# Patient Record
Sex: MALE | Race: BLACK OR AFRICAN AMERICAN | NOT HISPANIC OR LATINO | Employment: FULL TIME | URBAN - METROPOLITAN AREA
[De-identification: names, ages, dates, MRNs, and addresses within clinical notes are randomized per-mention and may not be internally consistent; named-entity substitution may affect disease eponyms.]

---

## 2018-04-14 ENCOUNTER — HOSPITAL ENCOUNTER (EMERGENCY)
Facility: CLINIC | Age: 58
Discharge: HOME | End: 2018-04-14
Attending: EMERGENCY MEDICINE | Admitting: EMERGENCY MEDICINE
Payer: COMMERCIAL

## 2018-04-14 VITALS
TEMPERATURE: 97.5 F | RESPIRATION RATE: 16 BRPM | OXYGEN SATURATION: 100 % | SYSTOLIC BLOOD PRESSURE: 131 MMHG | DIASTOLIC BLOOD PRESSURE: 82 MMHG

## 2018-04-14 DIAGNOSIS — Z53.29 LEFT AGAINST MEDICAL ADVICE: ICD-10-CM

## 2018-04-14 DIAGNOSIS — R40.20 LOSS OF CONSCIOUSNESS (H): ICD-10-CM

## 2018-04-14 DIAGNOSIS — Z86.79 HISTORY OF WOLFF-PARKINSON-WHITE (WPW) SYNDROME: ICD-10-CM

## 2018-04-14 LAB — GLUCOSE BLDC GLUCOMTR-MCNC: 104 MG/DL (ref 70–99)

## 2018-04-14 PROCEDURE — 99283 EMERGENCY DEPT VISIT LOW MDM: CPT | Performed by: EMERGENCY MEDICINE

## 2018-04-14 PROCEDURE — 93010 ELECTROCARDIOGRAM REPORT: CPT | Mod: Z6 | Performed by: EMERGENCY MEDICINE

## 2018-04-14 PROCEDURE — 93005 ELECTROCARDIOGRAM TRACING: CPT | Performed by: EMERGENCY MEDICINE

## 2018-04-14 PROCEDURE — 99283 EMERGENCY DEPT VISIT LOW MDM: CPT | Mod: 25 | Performed by: EMERGENCY MEDICINE

## 2018-04-14 PROCEDURE — 00000146 ZZHCL STATISTIC GLUCOSE BY METER IP

## 2018-04-14 ASSESSMENT — ENCOUNTER SYMPTOMS
BACK PAIN: 0
FEVER: 0
NAUSEA: 0
ABDOMINAL PAIN: 0
COUGH: 0
CHILLS: 0
SHORTNESS OF BREATH: 0
CONFUSION: 1
HEADACHES: 0
VOMITING: 0

## 2018-04-14 NOTE — ED NOTES
Pt refusing piv and labs. Asking to leave, but will not elaborate on why he does not want to stay for further evaluation. MD at bedside explaining risks associated with leaving before a medical screening could be completed and urging patient to stay for further workup. Pt very upset, saying he is not being treated kindly, asking to leave immediately, refusing all interventions, asking to sign an ama form, stating he understands all risks associated with leaving.

## 2018-04-14 NOTE — ED NOTES
Bed: ED10  Expected date: 4/14/18  Expected time: 4:37 PM  Means of arrival:   Comments:  [id number]    57M  Altered mental status

## 2018-04-14 NOTE — ED NOTES
"Pt comes in after episode of unresponsiveness at work this afternoon. Earlier today pt became very fatigued at work, took a break, at which point coworkers found him \"slumped over\" in a chair. Per EMS, arousable to painful stimuli. Upon arrival to the ED pt responsive, does not remember \"syncopal\" episode, but says these episodes have been happening more often lately. Last syncopal episode 1 week ago--found unresponsive on the bus. Per pt, he is supposed to have a \"scan to see what's wrong\" and was told \"my heart isn't working great\".     Refusing labs or PIV. La historian, MD at bedside. Alert and oriented upon arrival to the ED.   "

## 2018-04-14 NOTE — ED NOTES
Pt signed ama form in front of rn and md. Stated understanding of risks associated with leaving against medical advice. Left department.

## 2018-04-14 NOTE — ED PROVIDER NOTES
History     Chief Complaint   Patient presents with     Altered Mental Status     The history is provided by the patient.     Ainsley Leal is a 57 year old male with a history of CVA, atrial fibrillation with RVR, WPW status post ablation (on Eliquis) who presents to the Emergency Department today for evaluation of altered mental status. The patient is a poor historian making history difficult to obtain. The patient did report that he was working as a  on the [employer] and serving [employer] when he became fatigued and went to sit down. It is then reported by EMS that the patient patient was found by coworkers slumped over in a chair.  EMS was then called who reports that the patient was arousable to painful stimuli.  The patient is awake and alert currently in our ED.  The patient states that he does not remember his syncopal episode and states that the next thing he remembered was waking up in the ambulance.  He notes that he had eaten today, no prior hx of hypoglycemia.  The patient does report that he was having some chest pain and shortness of breath that started yesterday and went into this morning.  He reports that the pain radiated into his back.  The patient states that he is not currently experiencing those symptoms.  He reports that this pain was in the center of his chest.  He also notes that he has been having increasing numbness in his hands bilaterally that he states has been increasing over the course of several months.  The patient does report that he has been having some palpitations over the course of the past several months but states he did not experience any today.  He states that he did not lose bladder control during his episode today.  The patient does report that he has had similar episodes of syncope in the past and states that he had an episode while he [place] a week ago.  He reports that these episodes have been increasing, record review  "shows that this has been going on for several months.  The patient denies a history of seizures.  The patient denies having any headaches, nausea, vomiting, fever, chills, chest pain, or back pain.  The patient denies a history of issues with low blood sugar.    Per Care Everywhere, prior medical problems include the following: recurrent arrhythmia s/pain PVI ablation (08/2017) with early success, history of WPW, cure via ablation (03/2017)  acutely successful ablation for anterospetal/lillie-Hisian accessory pathway\", paroxysmal a-fib, prior history of stroke for which he received TPA, CMV hepatitis, prior history of cholangitis, prior history of crack cocaine use, positive GUTIERREZ, tobacco use disorder, recurrent episodes of  unwanted, uncontrolled, altered level of consciousness , question narcolepsy, prior history of pericarditis (04/2017). Patient has seen his cardiologist through [facility] in the past for episodes of syncope/fainting, has been advised to have a brain MRI which he has not done since (03/2017), at that point MRI was normal. He had an echocardiogram through [facility] 01/2018 that showed calculated left ventricular ejection fraction of 65% with no regional wall motion abnormalities, indeterminate left ventricular diastolic dysfunction, no significant valvular disease.     I have reviewed the Medications, Allergies, Past Medical and Surgical History, and Social History in the Epic system.    History reviewed. No pertinent past medical history.    History reviewed. No pertinent surgical history.    No family history on file.    Social History   Substance Use Topics     Smoking status: Current Every Day Smoker     Packs/day: 1.00     Years: 15.00     Smokeless tobacco: Not on file     Alcohol use No       Current Facility-Administered Medications   Medication     0.9% sodium chloride BOLUS     Current Outpatient Prescriptions   Medication     apixaban ANTICOAGULANT (ELIQUIS) 5 MG tablet     ASPIRIN PO      " "  Allergies   Allergen Reactions     Penicillins Anaphylaxis     Latex Itching      Review of Systems   Constitutional: Negative for chills and fever.   Respiratory: Negative for cough and shortness of breath (had some yesterday/this morning).    Cardiovascular: Negative for chest pain (had some yesterday/this morning).   Gastrointestinal: Negative for abdominal pain, nausea and vomiting.   Musculoskeletal: Negative for back pain.   Neurological: Positive for syncope. Negative for headaches.   Psychiatric/Behavioral: Positive for confusion.   All other systems reviewed and are negative.      Physical Exam   BP: 131/82  Heart Rate: 80  Temp: 97.5  F (36.4  C)  Resp: 16  SpO2: 100 %      Physical Exam   Constitutional: He is oriented to person, place, and time. No distress.   Adult male, appears sleepy, arousable to voice, unwilling to answer questions.  Irritable.  \"Why don't you ask the people who brought me why I'm here?\".  Very difficult to get any history from him.    HENT:   Head: Atraumatic.   Mouth/Throat: Oropharynx is clear and moist. No oropharyngeal exudate.   Eyes: Pupils are equal, round, and reactive to light. No scleral icterus.   Cardiovascular: Normal rate, regular rhythm, normal heart sounds and intact distal pulses.    No murmur heard.  Pulmonary/Chest: Effort normal and breath sounds normal. No respiratory distress. He has no wheezes. He has no rales.   Abdominal: Soft. Bowel sounds are normal. There is no tenderness.   Musculoskeletal: He exhibits no edema or tenderness.   Neurological: He is alert and oriented to person, place, and time. He has normal strength. No cranial nerve deficit or sensory deficit. Coordination normal. GCS eye subscore is 4. GCS verbal subscore is 5. GCS motor subscore is 6.   Skin: Skin is warm. No rash noted. He is not diaphoretic.   Psychiatric:   Uncooperative, irritable and at times frankly hostile, yelling at medical staff.     Nursing note and vitals " reviewed.      ED Course   4:55 PM  The patient was seen and examined by Dr. Espana in Room 10.    ED Course     Procedures             EKG Interpretation:      Interpreted by Kasia Espana  Time reviewed: 1700  Symptoms at time of EKG: None   Rhythm: normal sinus with PACs  Rate: Normal  Axis: Left Axis Deviation  Ectopy: premature atrial contraction  Conduction: normal  ST Segments/ T Waves: No acute ischemic changes  Q Waves: none  Comparison to prior: No old EKG available    Clinical Impression: sinus rhythm with PACs, no sign of ischemia      Critical Care time:  none             Labs Ordered and Resulted from Time of ED Arrival Up to the Time of Departure from the ED - No data to display        Results for orders placed or performed during the hospital encounter of 04/14/18 (from the past 24 hour(s))   EKG 12 lead   Result Value Ref Range    Interpretation ECG Click View Image link to view waveform and result    Glucose by meter   Result Value Ref Range    Glucose 104 (H) 70 - 99 mg/dL        Assessments & Plan (with Medical Decision Making)   Patient presents for the above complaints. Please note it is extraordinarily difficult to get any history out of the patient, he is awake and alert, but is not overly interested in answering questions. He appears rather despondent. He did tell me that he had a history of WPW and was ablated for this. All of his care has been through [facility].     With sudden loss of consciousness, need to consider any number of etiologies. Electrolyte abnormality is possible such has hypoglycemia, also need to consider electrolyte abnormalities such as hyponatremia. No clinical reason for this based on history, but we will check. Seizure is possible, though it does not appear that he was noted to have any tonic-clonic movements by his coworkers. No prior history of seizures. Did not lose control of bowel or bladder. Cardiac etiology is possible particularly given his cardiac history.  " He does report that he was successfully ablated for his WPW.  I do see this documented in Care Everywhere records by Cardiology.  Patient also was complaining of some chest pain radiating through to the back yesterday.  Unclear if this is musculoskeletal, but in the context of loss of consciousness combined with bilateral hand paresthesias need to consider aortic etiology.  Per Care Everywhere, he had an echocardiogram in January of this year which showed no  valvular disease at that point, no particular mention was made of the aorta that I can see.  He did have a cardiac CT angiogram done through [facility] 08/24/17 which showed normal caliber visualized aortic root, ascending aorta, and descending thoracic aorta at that time, also showed severe left atrial enlargement.  Other etiologies of symptoms could include a sleep disorder, it appears that his cardiologist was considering narcolepsy as a possible diagnosis given the fact that he appears to have had this happen multiple times and it sometimes can last for hours (patient reported to his cardiologist that he once [place] on the train and did not wake up for 5 hours).    EKG was done here in the emergency department.  This demonstrates sinus rhythm with PACs, he has a left anterior fascicular block, I do not particularly see a delta wave.  I do not see any sign of ectopy or ischemia at present.      Patient would not allow blood work to be drawn.  I went back into the room to discuss this with him.  He was extraordinarily irritable and hostile.  He claimed he was being treated \"like a guilty person\".  Of note, for most of his ED visit, he is lying back in bed, sleeping, and will open his eyes to voice but turns his head away and refuses to engage in conversation. He demanded to leave immediately.  I told him that that is his right, but I have to go over risks of leaving.  He then simply closed his eyes and turned away, would not respond to me when I told him " with the risks of leaving without evaluation or workup.  I repeated this to him at which point he became very angry and started yelling at me.  This was witnessed by the nurse.  Patient will be leaving AGAINST MEDICAL ADVICE as he does not wish to have a workup.  The patient recognizes that he is leaving AMA without a workup and the risks of leaving could include serious consequences including death and/or permanent disability.  Patient wishes to leave immediately.  Signed out AMA.    I have reviewed the nursing notes.    I have reviewed the findings, diagnosis, plan and need for follow up with the patient.  This part of the document was transcribed by Damaris Sloan Medical Scribe.   Discharge Medication List as of 4/14/2018  6:20 PM          Final diagnoses:   Loss of consciousness (H)   History of Sachin-Parkinson-White (WPW) syndrome   Left against medical advice   Alex BLACKBURN, am serving as a trained medical scribe to document services personally performed by Kasia Espana MD, based on the provider's statements to me.   Kasia BLACKBURN MD, was physically present and have reviewed and verified the accuracy of this note documented by Alex Vázquez.     4/14/2018   [facility], EMERGENCY DEPARTMENT     Kasia Espana MD  04/14/18 1913

## 2018-04-14 NOTE — DISCHARGE INSTRUCTIONS
You have been transported to the emergency department by paramedics for fainting.  You have declined to allow an evaluation.  You have requested to leave against medical advice.  You are acknowledging that you are risking the possibility of death or permanent disability by leaving without a workup.  You can return at any time if you change your mind.  You should follow-up with your primary clinic.

## 2018-04-15 LAB — INTERPRETATION ECG - MUSE: NORMAL

## 2018-11-18 ENCOUNTER — HOSPITAL ENCOUNTER (EMERGENCY)
Facility: CLINIC | Age: 58
Discharge: HOME | End: 2018-11-18
Attending: EMERGENCY MEDICINE | Admitting: EMERGENCY MEDICINE
Payer: COMMERCIAL

## 2018-11-18 VITALS
DIASTOLIC BLOOD PRESSURE: 89 MMHG | OXYGEN SATURATION: 96 % | RESPIRATION RATE: 18 BRPM | BODY MASS INDEX: 23.81 KG/M2 | SYSTOLIC BLOOD PRESSURE: 124 MMHG | TEMPERATURE: 97.7 F | WEIGHT: 152 LBS

## 2018-11-18 DIAGNOSIS — S61.217A LACERATION OF LEFT LITTLE FINGER WITHOUT FOREIGN BODY WITHOUT DAMAGE TO NAIL, INITIAL ENCOUNTER: ICD-10-CM

## 2018-11-18 PROCEDURE — 99283 EMERGENCY DEPT VISIT LOW MDM: CPT | Mod: 25 | Performed by: EMERGENCY MEDICINE

## 2018-11-18 PROCEDURE — 99283 EMERGENCY DEPT VISIT LOW MDM: CPT | Performed by: EMERGENCY MEDICINE

## 2018-11-18 PROCEDURE — 12001 RPR S/N/AX/GEN/TRNK 2.5CM/<: CPT | Performed by: EMERGENCY MEDICINE

## 2018-11-18 PROCEDURE — 12001 RPR S/N/AX/GEN/TRNK 2.5CM/<: CPT | Mod: Z6 | Performed by: EMERGENCY MEDICINE

## 2018-11-18 NOTE — ED PROVIDER NOTES
[place] EMERGENCY DEPARTMENT ([facility])  November 18, 2018    History     Chief Complaint   Patient presents with     Laceration     HPI  Ainsley Leal is a 58 year old male with history n notable for atrial fibrillation with RVR (on Eliquis) who presents to the ED with a left pinky finger laceration. Patient states he cut himself with a knife while prepping food and had a decent amount of bleeding.  He denies any weakness or pain with range of motion in that finger.    Per [facility], patient's last tetanus was in 2016.    PAST MEDICAL HISTORY  No past medical history on file.  PAST SURGICAL HISTORY  No past surgical history on file.  FAMILY HISTORY  No family history on file.  SOCIAL HISTORY  Social History   Substance Use Topics     Smoking status: Current Every Day Smoker     Packs/day: 1.00     Years: 15.00     Smokeless tobacco: Not on file     Alcohol use No     MEDICATIONS  Current Facility-Administered Medications   Medication     skin closure adhesive (DERMABOND) vial     Current Outpatient Prescriptions   Medication     apixaban ANTICOAGULANT (ELIQUIS) 5 MG tablet     ASPIRIN PO     ALLERGIES  Allergies   Allergen Reactions     Penicillins Anaphylaxis     Latex Itching       I have reviewed the Medications, Allergies, Past Medical and Surgical History, and Social History in the Epic system.    Review of Systems  ROS: 14 point ROS neg other than the symptoms noted above in the HPI.    Physical Exam   BP: 133/89  Heart Rate: 118  Temp: 97.7  F (36.5  C)  Weight: 68.9 kg (152 lb)  SpO2: 98 %      Physical Exam  GEN: Well appearing, non toxic, cooperative.   HEENT: The head is normocephalic and atraumatic. Pupils are equal round and reactive to light. Extraocular motions are intact. There is no facial swelling. The neck is nontender and supple.   CV: Regular rate and rhythm without murmurs rubs or gallops. 2+ radial pulses bilaterally.  PULM: Clear to auscultation bilaterally.  ABD: Soft,  nontender, nondistended.   EXT: Full range of motion.  No edema.  1 cm laceration to the lateral aspect of his left fifth digit.  Strength intact in all directions.  Sensation intact.  Good cap refill distal to the laceration.  NEURO: Cranial nerves II through XII are intact and symmetric. Bilateral upper and lower extremities grossly show full range of motion without any focal deficits.   SKIN: No rashes, ecchymosis, or lacerations  PSYCH: Calm and cooperative, interactive.     ED Course     ED Course     Laceration repair  Date/Time: 11/18/2018 12:18 PM  Performed by: MARYJANE JENSEN  Authorized by: MARYJANE JENSEN   Consent: Verbal consent obtained.  Risks and benefits: risks, benefits and alternatives were discussed  Consent given by: patient  Patient understanding: patient states understanding of the procedure being performed  Site marked: the operative site was marked  Patient identity confirmed: verbally with patient  Body area: upper extremity  Location details: left small finger  Laceration length: 1 cm  Tendon involvement: none  Nerve involvement: none  Vascular damage: no    Sedation:  Patient sedated: no  Irrigation solution: tap water  Amount of cleaning: extensive  Debridement: none  Skin closure: glue  Approximation: close  Approximation difficulty: simple  Patient tolerance: Patient tolerated the procedure well with no immediate complications         11:43 AM  The patient was seen and examined by Dr. Jensen in Room 7.                Critical Care time:  none             Labs Ordered and Resulted from Time of ED Arrival Up to the Time of Departure from the ED - No data to display         Assessments & Plan (with Medical Decision Making)   Patient is a 58-year-old male who presents with a 1 cm laceration to the lateral aspect of his left fifth digit.  Laceration occurred while he was preparing food.  Initial vitals were within normal limits.  Exam as above most notable for laceration.  Patient had full  range of motion and CMS was intact distal to the laceration.  Laceration was cleaned with tap water extensively and closed with Dermabond glue.  See attached procedure note for further details.  Patient's tetanus was up-to-date and did not did not require booster.  Patient was advised to follow-up with his primary doctor as needed and also given strict return precautions for any signs of infection.  Patient discharged home in stable condition.    I have reviewed the nursing notes.    I have reviewed the findings, diagnosis, plan and need for follow up with the patient.    New Prescriptions    No medications on file       Final diagnoses:   Laceration of left little finger without foreign body without damage to nail, initial encounter     I, Zaid Tillman, am serving as a trained medical scribe to document services personally performed by Speedy Jensen MD, based on the provider's statements to me.      I,  Speedy Jensen MD, was physically present and have reviewed and verified the accuracy of this note documented by Zaid Tillman.     11/18/2018   [facility], [place], EMERGENCY DEPARTMENT     Speedy Jensen MD  11/18/18 1223

## 2018-11-18 NOTE — DISCHARGE INSTRUCTIONS
* Laceration (All Closures)  A laceration is a cut through the skin. This will usually require stitches (sutures) or staples if it is deep. Minor cuts may be treated with a tape closure ( Steri-Strips ) or Dermabond skin glue.    Home Care:  PAIN MEDICINE: You may use acetaminophen (Tylenol) 650-1000 mg every 6 hours or ibuprofen (Motrin, Advil) 600 mg every 6-8 hours with food to control pain, if you are able to take these medicines. [NOTE: If you have chronic liver or kidney disease or ever had a stomach ulcer or GI bleeding, talk with your doctor before using these medicines.]  EXTREMITY, FACE or TRUNK WOUNDS:    Keep the wound clean and dry. If a bandage was applied and it becomes wet or dirty, replace it. Otherwise, leave it in place for the first 24 hours.    If stitches or staples were used, clean the wound daily. Protect the wound from sunlight and tanning lamps.    After removing the bandage, wash the area with soap and water. Use a wet cotton swab (Q tip) to loosen and remove any blood or crust that forms.    After cleaning, apply a thin layer of Polysporin or Bacitracin ointment. This will keep the wound clean and make it easier to remove the stitches or staples. Reapply a fresh bandage.    You may remove the bandage to shower as usual after the first 24 hours, but do not soak the area in water (no swimming) until the stitches or staples are removed.    If Steri-Strips were used, keep the area clean and dry. If it becomes wet, blot it dry with a towel. It is okay to take a brief shower, but avoid scrubbing the area.    If Dermabond skin adhesive was used, do not scratch, rub or pick at the adhesive film. Do not place tape directly over the film. Do not apply liquid, ointment or creams to the wound while the film is in place. Do not clean the wound with peroxide and do not apply ointments. Avoid activities that cause heavy sweating until the film has fallen off. Protect the wound from prolonged exposure  to sunlight or tanning lamps. You may shower as usual but do not soak the wound in water (no baths or swimming). The film will fall off by itself in 5-10 days.  SCALP WOUNDS: During the first two days, you may carefully rinse your hair in the shower to remove blood, glass or dirt particles. After two days, you may shower and shampoo your hair normally. Do not soak your scalp in the tub or go swimming until the stitches or staples have been removed.  MOUTH WOUNDS: Eat soft foods to reduce pain. If the cut is inside of your mouth, clean by rinsing after each meal and at bedtime with a mixture of equal parts water and Hydrogen Peroxide (do not swallow!). Or, you can use a cotton swab to directly apply Hydrogen Peroxide onto the cut.  After the wound is done healing, use sunscreen over the area whenever exposed for the next 6 minths to avoid a darker scar.  Follow Up:  Most skin wounds heal within ten days. Mouth and facial wounds heal within five days. However, even with proper treatment, a wound infection may sometimes occur. Therefore, you should check the wound daily for signs of infection listed below.  Stitches should be removed from the face within five days; stitches and staples should be removed from other parts of the body within 7-10 days. Unless you are told to come back to the emergency room, you may have your doctor or urgent care remove the stitches. If dissolving stitches were used in the mouth, these will fall out or dissolve without the need for removal. If tape closures ( Steri-Strips ) were used, remove them yourself if they have not fallen off after 7 days. If Dermabond skin glue was used, the film will fall off by itself in 5-10 days.   Get Prompt Medical Attention  if any of the following occur:    Increasing pain in the wound    Redness, swelling or pus coming from the wound    Fever over 101 F (38.3 C) oral    If stitches or staples come apart or fall out or if Steri-Strips fall off before seven  days    If the wound edges re-open    Bleeding not controlled by direct pressure    0788-3490 [employer]. [address] [place] [address], [place] [address]. All rights reserved. This information is not intended as a substitute for professional medical care. Always follow your healthcare professional's instructions.  This information has been modified by your health care provider with permission from the publisher.  Modifications clinically reviewed by Dr. Davon Kearns on 7/20/18.

## 2018-11-18 NOTE — ED TRIAGE NOTES
Pt arrived to the ED with a laceration on his left 5th finger. Per pt he cut it with a knife today. On arrival HR 110s, per pt that is average for pt. Pt alert and oriented x4. Pt reports he is on oral blood thinners.

## 2018-11-18 NOTE — ED AVS SNAPSHOT
[facility], [place], Emergency Department    [address] [place] [address]    Phone:  [phone]                                       Ainsley Leal   MRN: [id number]    Department:  [facility], Emergency Department   Date of Visit:  11/18/2018           After Visit Summary Signature Page     I have received my discharge instructions, and my questions have been answered. I have discussed any challenges I see with this plan with the nurse or doctor.    ..........................................................................................................................................  Patient/Patient Representative Signature      ..........................................................................................................................................  Patient Representative Print Name and Relationship to Patient    ..................................................               ................................................  Date                                   Time    ..........................................................................................................................................  Reviewed by Signature/Title    ...................................................              ..............................................  Date                                               Time          22EPIC Rev 08/18

## 2018-11-18 NOTE — ED AVS SNAPSHOT
[facility], Emergency Department    [address] [facility] [address]    Phone:  [phone]                                       Ainsley Leal   MRN: [id number]    Department:  [facility], Emergency Department   Date of Visit:  11/18/2018           Patient Information     Date Of Birth          1960        Your diagnoses for this visit were:     Laceration of left little finger without foreign body without damage to nail, initial encounter        You were seen by Speedy Jensen MD.      Follow-up Information     Schedule an appointment as soon as possible for a visit with Vik Stevenson MD.    Specialty:  Physician Assistant    Why:  As needed    Contact information:    [facility]  [address] [facility] [address] [phone]          Discharge Instructions          * Laceration (All Closures)  A laceration is a cut through the skin. This will usually require stitches (sutures) or staples if it is deep. Minor cuts may be treated with a tape closure ( Steri-Strips ) or Dermabond skin glue.    Home Care:  PAIN MEDICINE: You may use acetaminophen (Tylenol) 650-1000 mg every 6 hours or ibuprofen (Motrin, Advil) 600 mg every 6-8 hours with food to control pain, if you are able to take these medicines. [NOTE: If you have chronic liver or kidney disease or ever had a stomach ulcer or GI bleeding, talk with your doctor before using these medicines.]  EXTREMITY, FACE or TRUNK WOUNDS:    Keep the wound clean and dry. If a bandage was applied and it becomes wet or dirty, replace it. Otherwise, leave it in place for the first 24 hours.    If stitches or staples were used, clean the wound daily. Protect the wound from sunlight and tanning lamps.    After removing the bandage, wash the area with soap and water. Use a wet cotton swab (Q tip) to loosen and remove any blood or crust that forms.    After cleaning, apply a thin layer of Polysporin or Bacitracin ointment. This  will keep the wound clean and make it easier to remove the stitches or staples. Reapply a fresh bandage.    You may remove the bandage to shower as usual after the first 24 hours, but do not soak the area in water (no swimming) until the stitches or staples are removed.    If Steri-Strips were used, keep the area clean and dry. If it becomes wet, blot it dry with a towel. It is okay to take a brief shower, but avoid scrubbing the area.    If Dermabond skin adhesive was used, do not scratch, rub or pick at the adhesive film. Do not place tape directly over the film. Do not apply liquid, ointment or creams to the wound while the film is in place. Do not clean the wound with peroxide and do not apply ointments. Avoid activities that cause heavy sweating until the film has fallen off. Protect the wound from prolonged exposure to sunlight or tanning lamps. You may shower as usual but do not soak the wound in water (no baths or swimming). The film will fall off by itself in 5-10 days.  SCALP WOUNDS: During the first two days, you may carefully rinse your hair in the shower to remove blood, glass or dirt particles. After two days, you may shower and shampoo your hair normally. Do not soak your scalp in the tub or go swimming until the stitches or staples have been removed.  MOUTH WOUNDS: Eat soft foods to reduce pain. If the cut is inside of your mouth, clean by rinsing after each meal and at bedtime with a mixture of equal parts water and Hydrogen Peroxide (do not swallow!). Or, you can use a cotton swab to directly apply Hydrogen Peroxide onto the cut.  After the wound is done healing, use sunscreen over the area whenever exposed for the next 6 minths to avoid a darker scar.  Follow Up:  Most skin wounds heal within ten days. Mouth and facial wounds heal within five days. However, even with proper treatment, a wound infection may sometimes occur. Therefore, you should check the wound daily for signs of infection listed  below.  Stitches should be removed from the face within five days; stitches and staples should be removed from other parts of the body within 7-10 days. Unless you are told to come back to the emergency room, you may have your doctor or urgent care remove the stitches. If dissolving stitches were used in the mouth, these will fall out or dissolve without the need for removal. If tape closures ( Steri-Strips ) were used, remove them yourself if they have not fallen off after 7 days. If Dermabond skin glue was used, the film will fall off by itself in 5-10 days.   Get Prompt Medical Attention  if any of the following occur:    Increasing pain in the wound    Redness, swelling or pus coming from the wound    Fever over 101 F (38.3 C) oral    If stitches or staples come apart or fall out or if Steri-Strips fall off before seven days    If the wound edges re-open    Bleeding not controlled by direct pressure    2780-3279 [employer]. [address] [place] [address]. All rights reserved. This information is not intended as a substitute for professional medical care. Always follow your healthcare professional's instructions.  This information has been modified by your health care provider with permission from the publisher.  Modifications clinically reviewed by Dr. Davon Kearns on 7/20/18.      24 Hour Appointment Hotline       To make an appointment at any [facility], call [phone] ([phone]). If you don't have a family doctor or clinic, we will help you find one. [place] clinics are conveniently located to serve the needs of you and your family.             Review of your medicines      Our records show that you are taking the medicines listed below. If these are incorrect, please call your family doctor or clinic.        Dose / Directions Last dose taken    ASPIRIN PO   Dose:  81 mg        Take 81 mg by mouth   Refills:  0        ELIQUIS 5 MG tablet   Dose:  5 mg   Generic drug:   "apixaban ANTICOAGULANT        Take 5 mg by mouth   Refills:  0                Procedures and tests performed during your visit     Laceration repair      Orders Needing Specimen Collection     None      Pending Results     No orders found from 2018 to 2018.            Pending Culture Results     No orders found from 2018 to 2018.            Pending Results Instructions     If you had any lab results that were not finalized at the time of your Discharge, you can call the ED Lab Result RN at [phone]. You will be contacted by this team for any positive Lab results or changes in treatment. The nurses are available 7 days a week from 10A to 6:30P.  You can leave a message 24 hours per day and they will return your call.        Thank you for choosing [facility]       Thank you for choosing [facility] for your care. Our goal is always to provide you with excellent care. Hearing back from our patients is one way we can continue to improve our services. Please take a few minutes to complete the written survey that you may receive in the mail after you visit with us. Thank you!        [employer] Information     [employer] lets you send messages to your doctor, view your test results, renew your prescriptions, schedule appointments and more. To sign up, go to [email or web address] . Click on \"Log in\" on the left side of the screen, which will take you to the Welcome page. Then click on \"Sign up Now\" on the right side of the page.     You will be asked to enter the access code listed below, as well as some personal information. Please follow the directions to create your username and password.     Your access code is: [email or web address]  Expires: 2019 12:26 PM     Your access code will  in 90 days. If you need help or a new code, please call your [facility] clinic or [phone].        Care EveryWhere ID     This is your Care EveryWhere ID. This could be used by other organizations to access your " [facility] medical records  [id number]        Equal Access to Services     POLINA LORENZO : Hadii estevan Paul, saul loza, kamran pope. So [facility] [phone].    ATENCIÓN: Si habla español, tiene a zavala disposición servicios gratuitos de asistencia lingüística. Llame al [phone].    We comply with applicable federal civil rights laws and [place] laws. We do not discriminate on the basis of race, color, national origin, age, disability, sex, sexual orientation, or gender identity.            After Visit Summary       This is your record. Keep this with you and show to your community pharmacist(s) and doctor(s) at your next visit.

## 2020-02-17 ENCOUNTER — HOSPITAL ENCOUNTER (EMERGENCY)
Facility: CLINIC | Age: 60
Discharge: HOME | End: 2020-02-18
Attending: EMERGENCY MEDICINE | Admitting: EMERGENCY MEDICINE
Payer: COMMERCIAL

## 2020-02-17 DIAGNOSIS — R10.31 RIGHT INGUINAL PAIN: ICD-10-CM

## 2020-02-17 PROCEDURE — 99285 EMERGENCY DEPT VISIT HI MDM: CPT | Mod: 25 | Performed by: EMERGENCY MEDICINE

## 2020-02-17 PROCEDURE — 99284 EMERGENCY DEPT VISIT MOD MDM: CPT | Mod: Z6 | Performed by: EMERGENCY MEDICINE

## 2020-02-17 ASSESSMENT — ENCOUNTER SYMPTOMS
SHORTNESS OF BREATH: 0
FATIGUE: 0
CHILLS: 0
LIGHT-HEADEDNESS: 0
WOUND: 0
DIARRHEA: 0
DIZZINESS: 0
FEVER: 0
WEAKNESS: 0

## 2020-02-17 ASSESSMENT — MIFFLIN-ST. JEOR: SCORE: 1486.71

## 2020-02-17 NOTE — ED AVS SNAPSHOT
[facility], [place], Emergency Department  [address] [facility] [address]  Phone:  [phone]                                    Ainsley Leal   MRN: [id number]    Department:  [facility], Emergency Department   Date of Visit:  2/17/2020           After Visit Summary Signature Page    I have received my discharge instructions, and my questions have been answered. I have discussed any challenges I see with this plan with the nurse or doctor.    ..........................................................................................................................................  Patient/Patient Representative Signature      ..........................................................................................................................................  Patient Representative Print Name and Relationship to Patient    ..................................................               ................................................  Date                                   Time    ..........................................................................................................................................  Reviewed by Signature/Title    ...................................................              ..............................................  Date                                               Time          22EPIC Rev 08/18

## 2020-02-18 ENCOUNTER — APPOINTMENT (OUTPATIENT)
Dept: CT IMAGING | Facility: CLINIC | Age: 60
End: 2020-02-18
Attending: EMERGENCY MEDICINE
Payer: COMMERCIAL

## 2020-02-18 ENCOUNTER — APPOINTMENT (OUTPATIENT)
Dept: ULTRASOUND IMAGING | Facility: CLINIC | Age: 60
End: 2020-02-18
Attending: EMERGENCY MEDICINE
Payer: COMMERCIAL

## 2020-02-18 VITALS
RESPIRATION RATE: 16 BRPM | HEIGHT: 67 IN | BODY MASS INDEX: 24.33 KG/M2 | DIASTOLIC BLOOD PRESSURE: 76 MMHG | SYSTOLIC BLOOD PRESSURE: 128 MMHG | HEART RATE: 75 BPM | OXYGEN SATURATION: 98 % | TEMPERATURE: 97.6 F | WEIGHT: 155 LBS

## 2020-02-18 VITALS
SYSTOLIC BLOOD PRESSURE: 111 MMHG | OXYGEN SATURATION: 98 % | TEMPERATURE: 98.2 F | WEIGHT: 155 LBS | HEIGHT: 67 IN | RESPIRATION RATE: 22 BRPM | BODY MASS INDEX: 24.33 KG/M2 | DIASTOLIC BLOOD PRESSURE: 64 MMHG | HEART RATE: 92 BPM

## 2020-02-18 DIAGNOSIS — R10.31 RIGHT GROIN PAIN: ICD-10-CM

## 2020-02-18 LAB
ALBUMIN SERPL-MCNC: 4.3 G/DL (ref 3.4–5)
ALBUMIN UR-MCNC: NEGATIVE MG/DL
ALP SERPL-CCNC: 92 U/L (ref 40–150)
ALT SERPL W P-5'-P-CCNC: 32 U/L (ref 0–70)
ANION GAP SERPL CALCULATED.3IONS-SCNC: 3 MMOL/L (ref 3–14)
APPEARANCE UR: CLEAR
AST SERPL W P-5'-P-CCNC: 19 U/L (ref 0–45)
BASOPHILS # BLD AUTO: 0.1 10E9/L (ref 0–0.2)
BASOPHILS NFR BLD AUTO: 1.5 %
BILIRUB SERPL-MCNC: 0.2 MG/DL (ref 0.2–1.3)
BILIRUB UR QL STRIP: NEGATIVE
BUN SERPL-MCNC: 19 MG/DL (ref 7–30)
CALCIUM SERPL-MCNC: 9.1 MG/DL (ref 8.5–10.1)
CHLORIDE SERPL-SCNC: 106 MMOL/L (ref 94–109)
CO2 SERPL-SCNC: 28 MMOL/L (ref 20–32)
COLOR UR AUTO: YELLOW
CREAT SERPL-MCNC: 0.54 MG/DL (ref 0.66–1.25)
DIFFERENTIAL METHOD BLD: NORMAL
EOSINOPHIL # BLD AUTO: 0.1 10E9/L (ref 0–0.7)
EOSINOPHIL NFR BLD AUTO: 1.5 %
ERYTHROCYTE [DISTWIDTH] IN BLOOD BY AUTOMATED COUNT: 13.5 % (ref 10–15)
GFR SERPL CREATININE-BSD FRML MDRD: >90 ML/MIN/{1.73_M2}
GLUCOSE SERPL-MCNC: 99 MG/DL (ref 70–99)
GLUCOSE UR STRIP-MCNC: NEGATIVE MG/DL
HCT VFR BLD AUTO: 49.7 % (ref 40–53)
HGB BLD-MCNC: 15.7 G/DL (ref 13.3–17.7)
HGB UR QL STRIP: NEGATIVE
IMM GRANULOCYTES # BLD: 0 10E9/L (ref 0–0.4)
IMM GRANULOCYTES NFR BLD: 0.4 %
KETONES UR STRIP-MCNC: NEGATIVE MG/DL
LEUKOCYTE ESTERASE UR QL STRIP: NEGATIVE
LYMPHOCYTES # BLD AUTO: 3 10E9/L (ref 0.8–5.3)
LYMPHOCYTES NFR BLD AUTO: 55.7 %
MCH RBC QN AUTO: 28.3 PG (ref 26.5–33)
MCHC RBC AUTO-ENTMCNC: 31.6 G/DL (ref 31.5–36.5)
MCV RBC AUTO: 90 FL (ref 78–100)
MONOCYTES # BLD AUTO: 0.6 10E9/L (ref 0–1.3)
MONOCYTES NFR BLD AUTO: 10.3 %
MUCOUS THREADS #/AREA URNS LPF: PRESENT /LPF
NEUTROPHILS # BLD AUTO: 1.6 10E9/L (ref 1.6–8.3)
NEUTROPHILS NFR BLD AUTO: 30.6 %
NITRATE UR QL: NEGATIVE
NRBC # BLD AUTO: 0 10*3/UL
NRBC BLD AUTO-RTO: 0 /100
PH UR STRIP: 5.5 PH (ref 5–7)
PLATELET # BLD AUTO: 296 10E9/L (ref 150–450)
POTASSIUM SERPL-SCNC: 4 MMOL/L (ref 3.4–5.3)
PROT SERPL-MCNC: 7.8 G/DL (ref 6.8–8.8)
RBC # BLD AUTO: 5.54 10E12/L (ref 4.4–5.9)
RBC #/AREA URNS AUTO: <1 /HPF (ref 0–2)
SODIUM SERPL-SCNC: 137 MMOL/L (ref 133–144)
SOURCE: ABNORMAL
SP GR UR STRIP: 1.02 (ref 1–1.03)
UROBILINOGEN UR STRIP-MCNC: NORMAL MG/DL (ref 0–2)
WBC # BLD AUTO: 5.3 10E9/L (ref 4–11)
WBC #/AREA URNS AUTO: <1 /HPF (ref 0–5)

## 2020-02-18 PROCEDURE — 25000128 H RX IP 250 OP 636: Performed by: EMERGENCY MEDICINE

## 2020-02-18 PROCEDURE — 96374 THER/PROPH/DIAG INJ IV PUSH: CPT

## 2020-02-18 PROCEDURE — 93971 EXTREMITY STUDY: CPT | Mod: RT

## 2020-02-18 PROCEDURE — 99285 EMERGENCY DEPT VISIT HI MDM: CPT | Mod: Z6 | Performed by: EMERGENCY MEDICINE

## 2020-02-18 PROCEDURE — 93976 VASCULAR STUDY: CPT

## 2020-02-18 PROCEDURE — 85025 COMPLETE CBC W/AUTO DIFF WBC: CPT | Performed by: EMERGENCY MEDICINE

## 2020-02-18 PROCEDURE — 99285 EMERGENCY DEPT VISIT HI MDM: CPT | Mod: 25

## 2020-02-18 PROCEDURE — 80053 COMPREHEN METABOLIC PANEL: CPT | Performed by: EMERGENCY MEDICINE

## 2020-02-18 PROCEDURE — 81001 URINALYSIS AUTO W/SCOPE: CPT | Performed by: EMERGENCY MEDICINE

## 2020-02-18 PROCEDURE — 74176 CT ABD & PELVIS W/O CONTRAST: CPT

## 2020-02-18 RX ORDER — HYDROMORPHONE HYDROCHLORIDE 1 MG/ML
0.5 INJECTION, SOLUTION INTRAMUSCULAR; INTRAVENOUS; SUBCUTANEOUS ONCE
Status: COMPLETED | OUTPATIENT
Start: 2020-02-18 | End: 2020-02-18

## 2020-02-18 RX ORDER — POLYETHYLENE GLYCOL 3350 17 G/17G
POWDER, FOR SOLUTION ORAL
Qty: 527 G | Refills: 0 | Status: SHIPPED | OUTPATIENT
Start: 2020-02-18 | End: 2024-05-02

## 2020-02-18 RX ORDER — ACETAMINOPHEN 500 MG
1000 TABLET ORAL 3 TIMES DAILY
Qty: 42 TABLET | Refills: 0 | Status: SHIPPED | OUTPATIENT
Start: 2020-02-18 | End: 2020-02-25

## 2020-02-18 RX ORDER — HYDROCODONE BITARTRATE AND ACETAMINOPHEN 5; 325 MG/1; MG/1
1 TABLET ORAL EVERY 6 HOURS PRN
Qty: 4 TABLET | Refills: 0 | Status: SHIPPED | OUTPATIENT
Start: 2020-02-18 | End: 2020-02-21

## 2020-02-18 RX ADMIN — HYDROMORPHONE HYDROCHLORIDE 0.5 MG: 1 INJECTION, SOLUTION INTRAMUSCULAR; INTRAVENOUS; SUBCUTANEOUS at 04:24

## 2020-02-18 ASSESSMENT — MIFFLIN-ST. JEOR: SCORE: 1486.71

## 2020-02-18 ASSESSMENT — ENCOUNTER SYMPTOMS
FEVER: 0
SHORTNESS OF BREATH: 0
COUGH: 0
ABDOMINAL PAIN: 1

## 2020-02-18 NOTE — ED TRIAGE NOTES
Pt arrived to the ER with pain in his right groin that started 5 days ago and is getting worse. Pt was here in the ER and asked to be discharged about an hour ago. Pt states that his pain is only getting worse. VSS and afebrile.

## 2020-02-18 NOTE — ED PROVIDER NOTES
"  History     Chief Complaint   Patient presents with     Groin Pain     HPI  Ainsley Leal is a 59 year old male who presents to the emergency department for the second time tonight with complaint of right groin pain.  Per the ER doctors note from earlier:  Ainsley Leal is a 59 year old male who presents Emergency Department today for complaints of groin pain.  Patient complains of groin pain that started 5 days ago with quality as \"stabbing\" and worsens with exertion and ambulation.  Patient states that his pain has been intermittent since onset which has been waking him up from his sleep.  Patient reports that today symptoms feels similar to his episode of shingles when he had it about 10 years ago.  However, he denies any rashes or wound to area of pain.  He denies any injuries or trauma.  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 Patient denies chest pain or shortness of breath.  He denies fever, chills, diarrhea, lightheadedness, fatigue, weakness or dizziness.    He denies any fever, cough, shortness of breath, nausea, vomiting, diarrhea, dysuria, hematuria.  He does state that he has some pain radiating around the back to the right flank.  Chart review reveals that he had a previous history of scrotal infection as well as previous history of kidney stones.  He states that the pain really radiates to the inguinal region rather than to his scrotum, and he denies any scrotal pain specifically and states this does not feel like when he had a scrotal infection.  He states that he tried to leave the ED earlier, but the pain became excruciating and he just could not tolerate it anymore.  Of note, he does take a blood thinner for A. fib.    History reviewed. No pertinent past medical history.    History reviewed. No pertinent surgical history.    No family history on file.    Social History     Tobacco Use     Smoking status: Current Every " "Day Smoker     Packs/day: 1.00     Years: 15.00     Pack years: 15.00   Substance Use Topics     Alcohol use: No         I have reviewed the Medications, Allergies, Past Medical and Surgical History, and Social History in the Epic system.    Review of Systems   Constitutional: Negative for fever.   Respiratory: Negative for cough and shortness of breath.    Cardiovascular: Negative for chest pain.   Gastrointestinal: Positive for abdominal pain (right groin pain).   Musculoskeletal:        Right flank pain   All other systems reviewed and are negative.      Physical Exam   BP: 130/89  Pulse: 81  Temp: 96  F (35.6  C)  Resp: 18  Height: 170.2 cm (5' 7\")  Weight: 70.3 kg (155 lb)  SpO2: 94 %      Physical Exam  Exam conducted with a chaperone present.   Constitutional:       General: He is in acute distress (appears uncomfortable).      Appearance: He is not diaphoretic.   HENT:      Head: Atraumatic.   Eyes:      General: No scleral icterus.     Pupils: Pupils are equal, round, and reactive to light.   Cardiovascular:      Heart sounds: Normal heart sounds.   Pulmonary:      Effort: No respiratory distress.      Breath sounds: Normal breath sounds.   Abdominal:      Palpations: Abdomen is soft.      Tenderness: There is no abdominal tenderness.          Comments: No hernia or mass appreciated   Genitourinary:     Comments: No testicular mass or tenderness appreciated, no scrotal swelling, erythema or induration  Musculoskeletal:         General: No tenderness.   Skin:     General: Skin is warm.      Findings: No rash.         ED Course        Procedures                           Labs Ordered and Resulted from Time of ED Arrival Up to the Time of Departure from the ED   COMPREHENSIVE METABOLIC PANEL - Abnormal; Notable for the following components:       Result Value    Creatinine 0.54 (*)     All other components within normal limits   ROUTINE UA WITH MICROSCOPIC - Abnormal; Notable for the following components:    " Mucous Urine Present (*)     All other components within normal limits   CBC WITH PLATELETS DIFFERENTIAL            Assessments & Plan (with Medical Decision Making)   The patient has no testicular or scrotal abnormality on exam.  He is tender in the inguinal region, though no mass appreciated.  He also complained of pain radiating around to his right flank.  I was concerned for the possibility kidney stone, so CT done.  He does have a tiny intrarenal stone incidentally noted on the left, but no stones on the right.  I did inform him of this incidental finding.  This is not causing his pain.  He did wonder whether he may be getting shingles, as he states he had pain like this before with shingles.  However, he reports 5 days for pain, and typically shingles pain only starts within the first couple of days prior to the rash erupting.  Thus, this seems less likely.  It is possible that his pain is musculoskeletal in nature.  He did have scrotal as well as DVT ultrasound during his first visit tonight, which were unremarkable.  He did have a lot of stool on the right side seen on the CT.  I do think it is worth trying to clean them out and will prescribe MiraLAX for this, though given the location of his pain on exam, I think this is less likely the cause of the symptoms.  He did look significantly uncomfortable upon arrival, and given this I will give a very small prescription, 4 tabs total, Vicodin to be used as needed for extreme pain.  I did alert him that this can be more constipating and strongly encouraged him to use the MiraLAX as well.  He is encouraged to follow-up primary care this week, return to the ER with new or worsening symptoms.  No evidence to suggest acute bacterial infection or surgical etiology at this time.    Dictation Disclaimer: Some of this Note has been completed with voice-recognition dictation software. Although errors are generally corrected real-time, there is the potential for a rare  error to be present in the completed chart.      I have reviewed the nursing notes.    I have reviewed the findings, diagnosis, plan and need for follow up with the patient.    Discharge Medication List as of 2/18/2020  5:57 AM      START taking these medications    Details   HYDROcodone-acetaminophen (NORCO) 5-325 MG tablet Take 1 tablet by mouth every 6 hours as needed for severe pain, Disp-4 tablet, R-0, Local Print      polyethylene glycol (MIRALAX) powder Use 1 capful, mixed in 8 oz of liquid twice daily until having soft stool, then once daily as needed for constipation, Disp-527 g, R-0, Local Print             Final diagnoses:   Right groin pain       2/18/2020   [facility], [place], EMERGENCY DEPARTMENT     Tara Cazares MD  02/18/20 0689

## 2020-02-18 NOTE — DISCHARGE INSTRUCTIONS
Use the prescribed pain medication only for severe pain. Otherwise use Tylenol. Use the stool medication as prescribed. Follow up with your clinic doctor this week. Return to the ER with new or worsening symptoms, or any other concerns.

## 2020-02-18 NOTE — ED PROVIDER NOTES
"    [place] EMERGENCY DEPARTMENT ([facility])  February 17, 2020  History     Chief Complaint   Patient presents with     Groin Pain     The history is provided by the patient and medical records.     Ainsley Leal is a 59 year old male who presents Emergency Department today for complaints of groin pain.  Patient complains of groin pain that started 5 days ago with quality as \"stabbing\" and worsens with exertion and ambulation.  Patient states that his pain has been intermittent since onset which has been waking him up from his sleep.  Patient reports that today symptoms feels similar to his episode of shingles when he had it about 10 years ago.  However, he denies any rashes or wound to area of pain.  He denies any injuries or trauma.  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 Patient denies chest pain or shortness of breath.  He denies fever, chills, diarrhea, lightheadedness, fatigue, weakness or dizziness.    No past medical history on file.    No past surgical history on file.    No family history on file.    Social History     Tobacco Use     Smoking status: Current Every Day Smoker     Packs/day: 1.00     Years: 15.00     Pack years: 15.00   Substance Use Topics     Alcohol use: No     No current facility-administered medications for this encounter.      Current Outpatient Medications   Medication     apixaban ANTICOAGULANT (ELIQUIS) 5 MG tablet     ASPIRIN PO     Allergies   Allergen Reactions     Penicillins Anaphylaxis     Latex Itching     I have reviewed the Medications, Allergies, Past Medical and Surgical History, and Social History in the Epic system.    Review of Systems   Constitutional: Negative for chills, fatigue and fever.   Respiratory: Negative for shortness of breath.    Cardiovascular: Negative for chest pain.   Gastrointestinal: Negative for diarrhea.   Skin: Negative for rash and wound.   Neurological: " "Negative for dizziness, weakness and light-headedness.   All other systems reviewed and are negative.      Physical Exam   BP: (!) 144/92  Pulse: 92  Temp: 98.2  F (36.8  C)  Resp: 22  Height: 170.2 cm (5' 7\")  Weight: 70.3 kg (155 lb)  SpO2: 96 %      Physical Exam  Nursing note reviewed.   Constitutional:       General: He is not in acute distress.     Appearance: Normal appearance. He is not diaphoretic.   HENT:      Head: Atraumatic.   Eyes:      General: No scleral icterus.     Pupils: Pupils are equal, round, and reactive to light.   Cardiovascular:      Rate and Rhythm: Normal rate and regular rhythm.      Heart sounds: Normal heart sounds.   Pulmonary:      Effort: No respiratory distress.      Breath sounds: Normal breath sounds.   Abdominal:      General: Bowel sounds are normal.      Palpations: Abdomen is soft.      Tenderness: There is no abdominal tenderness.   Musculoskeletal:         General: No tenderness.   Skin:     General: Skin is warm.      Findings: No rash.   Neurological:      Mental Status: He is alert.         ED Course        Procedures                           Labs Ordered and Resulted from Time of ED Arrival Up to the Time of Departure from the ED - No data to display         Assessments & Plan (with Medical Decision Making)     59 year old male who presents Emergency Department today for complaints of groin pain.  Patient presentation concerning for possible scrotal pathology, DVT, kidney stone.  Patient declined have any blood draws, however he is agreeable to sonography.  Patient had a scrotal ultrasound and a right lower extremity ultrasound both of which were negative for any acute process.  Prior to providing a urine sample, patient requested discharge home stating that his pain had improved since arrival.  Plan for follow-up with primary care provider for further evaluation and care.    I have reviewed the nursing notes.    I have reviewed the findings, diagnosis, plan and need " for follow up with the patient.    New Prescriptions    No medications on file       Final diagnoses:   Right inguinal pain       2/17/2020   [facility], [place], EMERGENCY DEPARTMENT     Taty Aguirre MD  02/18/20 [id number]

## 2020-02-18 NOTE — ED TRIAGE NOTES
Pt presented unaccompanied, c/o R groin/ lower Abd pain x5 days rated 9/10 at triage. Pt denied falls, denied heavy lifting. Pt stated he has Hx shingles approx 10 years ago and believes this pain is similar.

## 2020-02-18 NOTE — ED AVS SNAPSHOT
[facility], [place], Emergency Department  [address] [address]  Phone:  [phone]                                    Ainsley Leal   MRN: [id number]    Department:  [facility], Emergency Department   Date of Visit:  2/18/2020           After Visit Summary Signature Page    I have received my discharge instructions, and my questions have been answered. I have discussed any challenges I see with this plan with the nurse or doctor.    ..........................................................................................................................................  Patient/Patient Representative Signature      ..........................................................................................................................................  Patient Representative Print Name and Relationship to Patient    ..................................................               ................................................  Date                                   Time    ..........................................................................................................................................  Reviewed by Signature/Title    ...................................................              ..............................................  Date                                               Time          22EPIC Rev 08/18

## 2022-05-13 ENCOUNTER — HOSPITAL ENCOUNTER (EMERGENCY)
Facility: CLINIC | Age: 62
Discharge: HOME | End: 2022-05-13
Attending: EMERGENCY MEDICINE | Admitting: EMERGENCY MEDICINE
Payer: COMMERCIAL

## 2022-05-13 VITALS
OXYGEN SATURATION: 99 % | WEIGHT: 179 LBS | DIASTOLIC BLOOD PRESSURE: 107 MMHG | BODY MASS INDEX: 28.09 KG/M2 | TEMPERATURE: 98.4 F | RESPIRATION RATE: 18 BRPM | SYSTOLIC BLOOD PRESSURE: 140 MMHG | HEART RATE: 101 BPM | HEIGHT: 67 IN

## 2022-05-13 DIAGNOSIS — H00.012 HORDEOLUM EXTERNUM OF RIGHT LOWER EYELID: ICD-10-CM

## 2022-05-13 PROCEDURE — 99284 EMERGENCY DEPT VISIT MOD MDM: CPT | Performed by: EMERGENCY MEDICINE

## 2022-05-13 PROCEDURE — 99283 EMERGENCY DEPT VISIT LOW MDM: CPT | Performed by: EMERGENCY MEDICINE

## 2022-05-13 RX ORDER — POLYVINYL ALCOHOL 14 MG/ML
1 SOLUTION/ DROPS OPHTHALMIC PRN
Qty: 30 ML | Refills: 0 | Status: SHIPPED | OUTPATIENT
Start: 2022-05-13 | End: 2022-05-13

## 2022-05-13 RX ORDER — ERYTHROMYCIN 5 MG/G
0.5 OINTMENT OPHTHALMIC 3 TIMES DAILY
Qty: 7 G | Refills: 0 | Status: SHIPPED | OUTPATIENT
Start: 2022-05-13 | End: 2022-05-18

## 2022-05-13 RX ORDER — POLYVINYL ALCOHOL 14 MG/ML
1 SOLUTION/ DROPS OPHTHALMIC PRN
Qty: 30 ML | Refills: 0 | Status: SHIPPED | OUTPATIENT
Start: 2022-05-13 | End: 2024-05-02

## 2022-05-13 NOTE — DISCHARGE INSTRUCTIONS
Instructions from your doctor today:  Emergency Department testing is focused on the potential causes of your symptoms that are the most dangerous possibilities, and cannot cover every possibility. Based on the evaluation, it was deemed sufficiently safe to discharge and continue management through the clinics. Thus, follow-up is very important to assess for improvement/worsening, potential further testing, and potential treatment adjustments. If you were given opioid pain medications or other medications that can make you drowsy while in the ED, you should not drive for at least several hours and not until you feel completely back to normal.     Please make an appointment to follow up with:  - Eye Clinic (phone: [phone]) in 4-7 days  - If you do not have a primary care provider, you can be seen in follow-up and establish care by calling any of the clinics below:     - [facility] (phone: [phone])     - Primary Care / [facility] (phone: [phone])   - Have your clinic provider review the results from today's visit with you again, including any potential follow-up or additional testing that may be needed based on the results. Occasionally, incidental findings are found on later review by radiologists that may need follow-up.     Return to the Emergency Department immediately if you have worsening symptoms, vision change, or any other urgent or potentially life-threatening concerns.

## 2022-05-13 NOTE — ED TRIAGE NOTES
Ainsley comes to the ED this morning for evaluation and treatment of 3 D history of reddened bump on the RIGHT lower lid.     Triage Assessment     Row Name 05/13/22 0805       Triage Assessment (Adult)    Airway WDL WDL       Respiratory WDL    Respiratory WDL WDL       Skin Circulation/Temperature WDL    Skin Circulation/Temperature WDL WDL       Cardiac WDL    Cardiac WDL WDL       Peripheral/Neurovascular WDL    Peripheral Neurovascular WDL WDL       Cognitive/Neuro/Behavioral WDL    Cognitive/Neuro/Behavioral WDL WDL

## 2022-05-13 NOTE — ED PROVIDER NOTES
"    [facility] ([facility])  5/13/22    History     Chief Complaint   Patient presents with     Eye Problem     HPI  Ainsley Leal is a 61 year old male who presents to the ED with swelling near his his right eye.  He states that he first noticed the symptoms 2 days ago and is persisting through to today.  He denies any pain with moving his eye, does not have any eye pain at rest.  He does have some discomfort when he rubs his eye.  He wears glasses.  He reports allergy to penicillin. Patient points to lower lid medial aspect as location, constant, aching quality, non-radiating.     I have reviewed the Past Medical History with the patient, pertinent items: on apixaban    Review of Systems  No recent fevers, no chest pain, no abdominal pain    Physical Exam   BP: (!) 140/107  Pulse: 101  Temp: 98.4  F (36.9  C)  Resp: 18  Height: 170.2 cm (5' 7\")  Weight: 81.2 kg (179 lb)  SpO2: 99 %    Physical Exam  General: No acute distress. Appears stated age.   HENT: MMM, no oropharyngeal lesions  Eyes: PERRL, normal sclerae.  Stye noted on right lower canthus. Corrected vision grossly normal.   Cardio: Regular rate, extremities well perfused  Resp: Normal work of breathing, normal respiratory rate  Neuro: alert and fully oriented. CN II-XII grossly intact. Grossly normal strength and sensation in all extremities.   MSK: no deformities. Grossly normal ROM in extremities.   Integumentary/Skin: no rash, normal color  Psych: normal affect, normal behavior    ED Course      Procedures       Critical Care time:  none     Labs Ordered and Resulted from Time of ED Arrival to Time of ED Departure - No data to display  No orders to display          Assessments & Plan (with Medical Decision Making)   Patient presenting with small area of painful swelling on the medial aspect of the right lower lid. Vitals in the ED unremarkable. Nursing notes reviewed.  Exam consistent with a stye.  No surrounding " erythema, warmth, nor tenderness to suggest periorbital/orbital cellulitis.    The complete clinical picture is most consistent with hordeolum externum (stye). After counseling on the diagnosis, work-up, and treatment plan, the patient was discharged.  Provided patient a printout on stye treatment, including recommendation on warm compresses and antibiotic ointment.  The patient was advised to follow-up with ophthalmology in a few days. The patient was advised to return to the ED if worsening symptoms, or if there are any urgent/life-threatening concerns.     Final diagnoses:   Hordeolum externum of right lower eyelid     New Prescriptions    ERYTHROMYCIN (ROMYCIN) 5 MG/GM OPHTHALMIC OINTMENT    Place 0.5 inches into the right eye 3 times daily for 5 days    POLYVINYL ALCOHOL (LIQUIFILM TEARS) 1.4 % OPHTHALMIC SOLUTION    Place 1 drop into the right eye as needed for dry eyes       I, Jen Tillman, am serving as a trained medical scribe to document services personally performed by Nakul Smith MD based on the provider's statements to me on May 13, 2022.  This document has been checked and approved by the attending provider.    INakul MD, was physically present and have reviewed and verified the accuracy of this note documented by Jen Tillman, medical scribe.      Nakul Smith MD     Emergency Medicine   formerly [facility] EMERGENCY DEPARTMENT  5/13/2022     Nakul Smith MD  05/13/22 0918

## 2024-01-15 ENCOUNTER — APPOINTMENT (OUTPATIENT)
Dept: GENERAL RADIOLOGY | Facility: CLINIC | Age: 64
End: 2024-01-15
Attending: EMERGENCY MEDICINE
Payer: COMMERCIAL

## 2024-01-15 ENCOUNTER — HOSPITAL ENCOUNTER (EMERGENCY)
Facility: CLINIC | Age: 64
Discharge: HOME | End: 2024-01-15
Attending: EMERGENCY MEDICINE | Admitting: EMERGENCY MEDICINE
Payer: COMMERCIAL

## 2024-01-15 ENCOUNTER — APPOINTMENT (OUTPATIENT)
Dept: CT IMAGING | Facility: CLINIC | Age: 64
End: 2024-01-15
Attending: EMERGENCY MEDICINE
Payer: COMMERCIAL

## 2024-01-15 VITALS
TEMPERATURE: 98.3 F | SYSTOLIC BLOOD PRESSURE: 115 MMHG | DIASTOLIC BLOOD PRESSURE: 70 MMHG | OXYGEN SATURATION: 93 % | RESPIRATION RATE: 24 BRPM | HEART RATE: 92 BPM

## 2024-01-15 DIAGNOSIS — R07.9 CHEST PAIN, UNSPECIFIED TYPE: ICD-10-CM

## 2024-01-15 LAB
ATRIAL RATE - MUSE: 113 BPM
DIASTOLIC BLOOD PRESSURE - MUSE: NORMAL MMHG
INTERPRETATION ECG - MUSE: NORMAL
P AXIS - MUSE: 62 DEGREES
PR INTERVAL - MUSE: 142 MS
QRS DURATION - MUSE: 82 MS
QT - MUSE: 334 MS
QTC - MUSE: 458 MS
R AXIS - MUSE: -75 DEGREES
SYSTOLIC BLOOD PRESSURE - MUSE: NORMAL MMHG
T AXIS - MUSE: 38 DEGREES
VENTRICULAR RATE- MUSE: 113 BPM

## 2024-01-15 PROCEDURE — 70450 CT HEAD/BRAIN W/O DYE: CPT | Mod: 26 | Performed by: RADIOLOGY

## 2024-01-15 PROCEDURE — 93010 ELECTROCARDIOGRAM REPORT: CPT | Performed by: EMERGENCY MEDICINE

## 2024-01-15 PROCEDURE — 99284 EMERGENCY DEPT VISIT MOD MDM: CPT | Mod: 25 | Performed by: EMERGENCY MEDICINE

## 2024-01-15 PROCEDURE — 71046 X-RAY EXAM CHEST 2 VIEWS: CPT | Mod: 26 | Performed by: RADIOLOGY

## 2024-01-15 PROCEDURE — 93005 ELECTROCARDIOGRAM TRACING: CPT | Performed by: EMERGENCY MEDICINE

## 2024-01-15 PROCEDURE — 71046 X-RAY EXAM CHEST 2 VIEWS: CPT

## 2024-01-15 PROCEDURE — 70450 CT HEAD/BRAIN W/O DYE: CPT

## 2024-01-15 ASSESSMENT — ACTIVITIES OF DAILY LIVING (ADL): ADLS_ACUITY_SCORE: 35

## 2024-01-15 NOTE — ED NOTES
Bed: ED19  Expected date:   Expected time:   Means of arrival:   Comments:  H441  63 m chest pain/tachycardia  ETA 3 min

## 2024-01-15 NOTE — ED NOTES
Patient refused IV placement. RN asked if we could do a butterfly to get labs and the patient also declined stating he does not want to be poked. Explained to patient that with his CP and history it is important that we get the labs and patient declined again. MD notified.

## 2024-01-15 NOTE — ED TRIAGE NOTES
Patient BIBA from work  Started feeling CP, tingling in hands and feet, and rapid breathing  324 ASA  & 0.4 nitroglycerin given en route   Says that CP is better - does recall this happening before but never lasted as long as it did today

## 2024-01-15 NOTE — DISCHARGE INSTRUCTIONS
I was happy that your chest pain had relieved prior to arrival here.  However as we discussed now in your 60s you are high risk for having serious cause to explain your chest pain such as heart attack.  It was unfortunate you did not want any laboratory studies.  I it is difficult to  rule out that you have had strain or stress in your heart with obtaining these or further observing you.  I would strongly encourage you to follow-up closely with your primary care physician and would want you to return immediately should you have recurrence of chest pain, increase shortness of breath, increasing weakness, worsening of falls, nausea or any other concern.

## 2024-01-15 NOTE — ED PROVIDER NOTES
ED PROVIDER NOTE  January 15, 2024  History     Chief Complaint   Patient presents with    Chest Pain     HPI  Ainsley Leal is a 63 year old male who has a complicated history significant for WPW status post anterior septal ablation, atrial fibrillation status post PVI ablation in 2017, CVA, on chronic anticoagulation use.  Arrives today to the emergency department evaluation of chest discomfort.  Patient does report he does get intermittent chest discomfort however this typically last minutes.  Tonight while sleeping he was awoken from sleep with centralized chest discomfort rated at moderate to significant intensity.  This lasted longer than usual at approximate 4 hours.  Since arrival pain has essentially resolved without intervention.  He reports no significant shortness of breath or diaphoresis.  No associated nausea.  Reports no recent traumatic injury but does state he has had recent headaches over the past several months.  He has had increasing frequency of falls and reports numbness to his extremities which has been present for months.  No recent change in medications or noncompliance other than patient reports he has not taken his Eliquis as he has forgotten over the past several days.  Patient otherwise denies recent medical illness such as fever, chills, nausea, vomiting, nasal congestion, cough.      Past Medical History  No past medical history on file.  No past surgical history on file.  apixaban ANTICOAGULANT (ELIQUIS) 5 MG tablet  ASPIRIN PO  polyethylene glycol (MIRALAX) powder  polyvinyl alcohol (LIQUIFILM TEARS) 1.4 % ophthalmic solution      Allergies   Allergen Reactions    Pcn [Penicillins] Anaphylaxis    Latex Itching     Family History  No family history on file.  Social History   Social History     Tobacco Use    Smoking status: Every Day     Packs/day: 1.00     Years: 15.00     Additional pack years: 0.00     Total pack years: 15.00     Types: Cigarettes    Smokeless tobacco: Never    Substance Use Topics    Alcohol use: No    Drug use: No         A medically appropriate review of systems was performed with pertinent positives and negatives noted in the HPI, and all other systems negative.      Physical Exam   BP: 115/70  Pulse: 92  Temp: 98.3  F (36.8  C)  Resp: 24  SpO2: 93 %      Physical Exam  Vitals and nursing note reviewed.   Constitutional:       General: He is in acute distress.      Appearance: Normal appearance. He is not ill-appearing, toxic-appearing or diaphoretic.   HENT:      Head: Normocephalic and atraumatic.      Right Ear: External ear normal.      Left Ear: External ear normal.      Mouth/Throat:      Mouth: Mucous membranes are moist.   Eyes:      Extraocular Movements: Extraocular movements intact.      Conjunctiva/sclera: Conjunctivae normal.      Pupils: Pupils are equal, round, and reactive to light.   Cardiovascular:      Rate and Rhythm: Normal rate.      Pulses: Normal pulses.      Heart sounds: Normal heart sounds. No murmur heard.     No friction rub.   Pulmonary:      Effort: Pulmonary effort is normal. No respiratory distress.      Breath sounds: No stridor. No wheezing, rhonchi or rales.   Abdominal:      General: Abdomen is flat. There is no distension.      Tenderness: There is no abdominal tenderness. There is no guarding or rebound.   Musculoskeletal:         General: No deformity or signs of injury. Normal range of motion.      Cervical back: Normal range of motion.   Skin:     General: Skin is warm.      Capillary Refill: Capillary refill takes less than 2 seconds.      Coloration: Skin is not pale.      Findings: No bruising or erythema.   Neurological:      General: No focal deficit present.      Mental Status: He is alert and oriented to person, place, and time.      Cranial Nerves: No cranial nerve deficit.      Motor: No weakness.   Psychiatric:         Mood and Affect: Mood normal.         Behavior: Behavior normal.         ED Course         Procedures         Results for orders placed or performed during the hospital encounter of 01/15/24 (from the past 24 hour(s))   EKG 12-lead, tracing only   Result Value Ref Range    Systolic Blood Pressure  mmHg    Diastolic Blood Pressure  mmHg    Ventricular Rate 113 BPM    Atrial Rate 113 BPM    RI Interval 142 ms    QRS Duration 82 ms     ms    QTc 458 ms    P Axis 62 degrees    R AXIS -75 degrees    T Axis 38 degrees    Interpretation ECG       Sinus tachycardia with Premature atrial complexes  Pulmonary disease pattern  Left anterior fascicular block  Abnormal ECG     CT Head w/o Contrast    Narrative    CT HEAD W/O CONTRAST 1/15/2024 10:22 AM    History: headaches, falls     Comparison: No similar prior imaging    Technique: Using multidetector thin collimation helical acquisition  technique, axial, coronal and sagittal CT images from the skull base  to the vertex were obtained without intravenous contrast.   (topogram) image(s) also obtained and reviewed.    Findings: Mild cerebral atrophy. There is no intracranial hemorrhage,  mass effect, or midline shift. Gray/white matter differentiation in  both cerebral hemispheres is preserved. Ventricles are proportionate  to the cerebral sulci. The basal cisterns are clear.    The bony calvaria and the bones of the skull base are normal.  Multifocal periapical lucencies involving maxillary teeth with areas  of bony dehiscence of their sockets. The mastoid air cells are clear.  Polypoid mucosal thickening versus retention cysts along the floor of  the maxillary sinuses.      Impression    Impression:  No acute intracranial pathology.     I have personally reviewed the examination and initial interpretation  and I agree with the findings.    SMOOTH PEREZ MD         SYSTEM ID:  [id number]   XR Chest 2 Views    Narrative    Exam: XR CHEST 2 VIEWS, 1/15/2024 10:30 AM    Comparison: None    History: chest pain    Findings:  AP and lateral views of the  chest. Loop recorder device projects over  the left hemithorax. Trachea is midline. Cardiomediastinal silhouette  is borderline enlarged. Low lung volumes with mild interstitial  prominence and streaky bibasilar opacities. There is no pneumothorax  or pleural effusion. The upper abdomen is unremarkable. No acute  osseous abnormality. Multilevel degenerative changes throughout the  visualized spine.      Impression    Impression:   Low lung volumes with mild interstitial prominence and streaky  bibasilar opacities, may represent atelectasis and/or edema.    I have personally reviewed the examination and initial interpretation  and I agree with the findings.    FRANKY YEH MD         SYSTEM ID:  [id number]     Medications   sodium chloride 0.9% BOLUS 1,000 mL (has no administration in time range)             Critical care was not performed.     Medical Decision Making  The patient's presentation was of moderate complexity (an acute complicated injury).    The patient's evaluation involved:  review of external note(s) from 3+ sources (see separate area of note for details)  review of 3+ test result(s) ordered prior to this encounter (see separate area of note for details)      Assessments & Plan (with Medical Decision Making)     Ainsley Leal is a 63 year old male who has a complicated history significant for WPW status post anterior septal ablation, atrial fibrillation status post PVI ablation in 2017, CVA, on chronic anticoagulation use.  Arrives today to the emergency department evaluation of chest discomfort.  Upon arrival the patient is currently noted to be alert.  He is hemodynamically stable with a pulse in the 20s.  Voice is soft and appears to be somewhat frail but SpO2 currently in the mid 90s and patient protecting airway.  No visible signs of increased work of breathing.  Patient is chronically anticoagulated and despite recent missed doses the past several days low suspicion for PE,  pneumothorax, effusion, or infectious etiology such as pneumonia warranting emergent antibiotics or CT imaging of the chest.  I would plan for chest x-ray with chest discomfort.  Will plan for EKG and troponins with screening.  Patient does have moderate risk factors for ACS/CAD.  Otherwise with regard to headache the patient has had falls this has been present for months.  Low suspicion for acute intracranial process such as mass, bleed, stroke but would plan for imaging secondary to concurrent use of Eliquis and multiple falls.    Unfortunately shortly after arrival the patient states he is feeling well and did not want any laboratory studies.  I discussed at length with patient the concern about his story with worsening of chest discomfort and could not tell him this is not a heart attack or a life-threatening condition.  The patient verbalizes understanding and states that he has had this many times in his life and he is not concerned.  The patient is asked to leave the emergency department does not want any further workup.  He is chest pain-free at this time I have discussed and strongly urged him to follow-up closely with his primary care physician and her cardiologist and need to return immediately with any change, progression or worsening of symptoms.  I do not believe this patient is holdable and he does have capacity.  I do disagree however with his poor insight into the severity of chest pain.  We are certainly happy to reevaluate him at any time.    I have reviewed the nursing notes.    I have reviewed the findings, diagnosis, plan and need for follow up with the patient.    New Prescriptions    No medications on file       Final diagnoses:   Chest pain, unspecified type       DAV LOVING MD    1/15/2024   [facility] EMERGENCY DEPARTMENT     Dav Loving MD  01/15/24 0256

## 2024-01-22 ENCOUNTER — OFFICE VISIT (OUTPATIENT)
Dept: FAMILY MEDICINE | Facility: CLINIC | Age: 64
End: 2024-01-22
Payer: COMMERCIAL

## 2024-01-22 ENCOUNTER — TELEPHONE (OUTPATIENT)
Dept: CARDIOLOGY | Facility: CLINIC | Age: 64
End: 2024-01-22

## 2024-01-22 VITALS
DIASTOLIC BLOOD PRESSURE: 77 MMHG | RESPIRATION RATE: 16 BRPM | BODY MASS INDEX: 29.74 KG/M2 | HEIGHT: 67 IN | HEART RATE: 104 BPM | OXYGEN SATURATION: 95 % | TEMPERATURE: 98.3 F | WEIGHT: 189.5 LBS | SYSTOLIC BLOOD PRESSURE: 144 MMHG

## 2024-01-22 DIAGNOSIS — J30.2 SEASONAL ALLERGIC RHINITIS, UNSPECIFIED TRIGGER: ICD-10-CM

## 2024-01-22 DIAGNOSIS — R06.09 DOE (DYSPNEA ON EXERTION): Primary | ICD-10-CM

## 2024-01-22 DIAGNOSIS — Z86.79 HISTORY OF WOLFF-PARKINSON-WHITE (WPW) SYNDROME: ICD-10-CM

## 2024-01-22 DIAGNOSIS — I48.0 PAROXYSMAL ATRIAL FIBRILLATION (H): ICD-10-CM

## 2024-01-22 PROBLEM — I45.6 WPW (WOLFF-PARKINSON-WHITE SYNDROME): Status: ACTIVE | Noted: 2017-03-10

## 2024-01-22 PROBLEM — I47.10 SVT (SUPRAVENTRICULAR TACHYCARDIA) (H): Status: ACTIVE | Noted: 2017-03-10

## 2024-01-22 PROBLEM — I63.9 CEREBROVASCULAR ACCIDENT (CVA) (H): Status: ACTIVE | Noted: 2024-01-22

## 2024-01-22 PROBLEM — R10.9 ABDOMINAL PAIN: Status: ACTIVE | Noted: 2017-05-19

## 2024-01-22 PROBLEM — Z86.73 HISTORY OF CVA (CEREBROVASCULAR ACCIDENT): Status: ACTIVE | Noted: 2017-08-23

## 2024-01-22 PROBLEM — R76.8 POSITIVE ANA (ANTINUCLEAR ANTIBODY): Status: ACTIVE | Noted: 2017-05-25

## 2024-01-22 PROCEDURE — 99204 OFFICE O/P NEW MOD 45 MIN: CPT

## 2024-01-22 RX ORDER — MOMETASONE FUROATE MONOHYDRATE 50 UG/1
2 SPRAY, METERED NASAL DAILY
Qty: 17 G | Refills: 3 | Status: SHIPPED | OUTPATIENT
Start: 2024-01-22 | End: 2024-05-02

## 2024-01-22 ASSESSMENT — PAIN SCALES - GENERAL: PAINLEVEL: MODERATE PAIN (5)

## 2024-01-22 NOTE — PROGRESS NOTES
Assessment & Plan     1. PIEDRA (dyspnea on exertion)  - Adult Cardiology Eval  Referral; Future  2. History of Sachin-Parkinson-White (WPW) syndrome  - Adult Cardiology Eval  Referral; Future  3. Paroxysmal atrial fibrillation (H)  - Adult Cardiology Eval  Referral; Future    Last saw cardiology 9/2023 with similar complaints of today. He does not want labs to be completed today. Expiratory wheezes today and he continues to smoke, per cardiology note recommended PFTs, he declined this again today and would like to follow up with cardiology first prior to additional testing. On chronic anticoagulation with Eliquis. Is not taking the propanolol that has been prescribed. He would prefer to just follow up with a new cardiologist and is requesting paperwork to be filled out today as he calls in to work frequently related to his symptoms.      4. Seasonal allergic rhinitis, unspecified trigger  - mometasone (NASONEX) 50 MCG/ACT nasal spray; Spray 2 sprays into both nostrils daily  Dispense: 17 g; Refill: 3       Nicotine/Tobacco Cessation  He reports that he has been smoking cigarettes. He has a 15 pack-year smoking history. He has never used smokeless tobacco.  Nicotine/Tobacco Cessation Plan  Information offered: Patient not interested at this time    Return precautions discussed, including when to seek urgent/emergent care. Verbalized understanding and agrees with plan.        Adithya Schmitz is a 63 year old, presenting for the following health issues:  Recheck Medication and Establish Care ( racing heart, past open heart surgeries, off-balance, sweating, dizziness)      1/22/2024     7:09 AM   Additional Questions   Roomed by chavo thompson     History of Present Illness       Reason for visit:  Establish care, low energy    He eats 0-1 servings of fruits and vegetables daily.He consumes 1 sweetened beverage(s) daily.He exercises with enough effort to increase his heart rate 9 or less minutes  "per day.  He exercises with enough effort to increase his heart rate 3 or less days per week.   He is taking medications regularly.     History significant for WPW status post anterior septal ablation, atrial fibrillation status post PVI ablation in 2017, CVA, on chronic anticoagulation with eliquis.    Here today following up after being seen in the emergency department 1/15 for heart palpitaitons/chest pain, shortness of breath lasting approximately 4 hours. Symptoms spontaneously resolved without full work up. Declined lab draw at that visit. Was encouraged to follow up with his PCP and cardiology.     Prescribed propanolol but does not want to take it.  Last saw his cardiologist 9/2023. Would like to establish care with a cardiologist through [facility].     Adament that he does not want any labs drawn today.    History of chronic allergies. Would like nasal spray today.     Still smokes abot 10 cigs/day.    Arrives with [facility] paperwork.    Review of Systems  CONSTITUTIONAL: NEGATIVE for fever, chills, change in weight  ENT/MOUTH: NEGATIVE for ear, mouth and throat problems  RESP:dyspnea on exertion  CV: chest pain/pressure, dyspnea on exertion, lower extremity edema, and palpitations      Objective    BP (!) 144/77 (BP Location: Left arm, Patient Position: Sitting, Cuff Size: Adult Large)   Pulse 104   Temp 98.3  F (36.8  C)   Resp 16   Ht 1.702 m (5' 7\")   Wt 86 kg (189 lb 8 oz)   SpO2 95%   BMI 29.68 kg/m    Body mass index is 29.68 kg/m .    Physical Exam   GENERAL: alert and no distress  RESP: faint expiratory wheezes   CV: tachycardia, peripheral pulses strong, and bilateral trace ankle edema          Signed Electronically by: CHERELLE Sahu CNP    "

## 2024-01-22 NOTE — TELEPHONE ENCOUNTER
[facility] Call Center    Phone Message    May a detailed message be left on voicemail: yes     Reason for Call: Appointment Intake    Referring Provider Name: uJliane Holt APRN CNP   Diagnosis and/or Symptoms:     History of Sachin-Parkinson-White (WPW) syndrome [Z86.79]     Per patient - has device, records in patient chart. Please review and reach out to patient for scheduling.     Action Taken: Message routed to:  [facility] ([facility] Cardio    Travel Screening: Not Applicable

## 2024-01-26 NOTE — TELEPHONE ENCOUNTER
1/26 Scheduled pt to see [facility] w/ Dr. Deepali reyes/ Device check prior (pt has pacemaker and monitor). ASHWIN

## 2024-01-30 ENCOUNTER — TELEPHONE (OUTPATIENT)
Dept: FAMILY MEDICINE | Facility: CLINIC | Age: 64
End: 2024-01-30
Payer: COMMERCIAL

## 2024-01-30 NOTE — TELEPHONE ENCOUNTER
January 30, 2024    Cert of Health Care Provider  FMLA was picked up from outbox of Juliane Holt DNP.  Paperwork has been reviewed and is complete.  Per initial initial request, this was sent via fax to [phone].     Kaykay Miguel

## 2024-02-24 ENCOUNTER — HEALTH MAINTENANCE LETTER (OUTPATIENT)
Age: 64
End: 2024-02-24

## 2024-03-26 NOTE — TELEPHONE ENCOUNTER
RECORDS RECEIVED FROM:    DATE RECEIVED:    NOTES STATUS DETAILS   OFFICE NOTE from referring provider  Internal 1/22/24 - Jonathon VLILARREAL CNP [facility]    OFFICE NOTE from other cardiologists  N/A    RECORDS from hospital/ED Internal 1/15/24 [facility]    MEDICATION LIST Internal    GENERAL CARDIO RECORDS   (ALL APPOINTMENT TYPES)     EKG (STRIPS & REPORTS) Internal  1/15/24   STRESS TESTS (IMAGES AND REPORTS) Care Everywhere 4/10/23 - [facility]    NEW EP     ICD/PACEMAKER IMPLANT Yes    CARDIOVERSION N/A    TILT TABLE STUDIES N/A      Action 03/26/24 Faxed [employer]   [phone]   03/29/24 Called [facility] sending Stress Test to PACS - resolved in PACS    Action Taken Stress Test Results: 4/10/23

## 2024-04-02 NOTE — TELEPHONE ENCOUNTER
April 2, 2024    [facility] Paperwork was picked up from outbox of Juliane Holt DNP.  Paperwork has been reviewed and is complete.  Per initial initial request, this was sent via fax to [phone].     Kaykay Miguel

## 2024-04-17 NOTE — PROGRESS NOTES
"I am delighted to see Ainsley Leal as a new patient in cardiology clinic for evaluation of atrial arrhythmias.    History of Present Illness:  The patient is a 63 year old  male who presented with SVT on 3/11/2017, both narrow and wide complex tachycardias. He underwent EPS which demonstrated an anteroseptal accessory pathway with both antegrade and retrograde conduction, as well as inducible orthodromic reentry tachycardia, and the pathway was successfully ablated. On 3/13/2017 he presented to ED with atrial fibrillation with rates in the 160s, spontaneously converted. He was started on sotalol. On 3/15/2017 he developed right hemiparesis and was diagnosed with ischemic stroke, treated with tPA. He was discharged on apixaban. He underwent AF cryoablation on 8/29/2017. He has been maintained on apixaban.      \"Silvana" is here today with a friend whose employer is Juma's emergency contact. [place]. Due to insurance changes he wants to establish follow up here. He lives independently. He is not compliant with medications. His main complaint is chronic shortness of breath. He continues to smoke up to 15 cigs per day. He reported some rapid heart rates several weeks ago, did not seek medical care. He says that a monitor was implanted in him after his last ablation and he was told he needed to have the battery changed every 3 years or so and he does not want to do that. He had been prescribed propranolol several years ago for intermittent chest pain and he's never taken it.       Past Medical History:  SVT, presented with both narrow and wide tachycardia; EPS 3/11/2017 showed baseline HV 42 ms, however atrial pacing showed preexcitation with QRS similar to his WCT; s/p ablation anteroseptal pathway  Atrial fibrillation, initially presented 3/13/2017, spontaneous conversion, started on sotalol; s/p cryoablation + touch up RSVP/RA connection, 8/29/2017. ILR implanted.  CVA treated by tPA " 3/15/2017  Prior cocaine use  Ongoing tobacco use       Medications:   Apixaban 5 mg, he takes it daily  Aspirin 81 mg some times    Allergies:    Allergies   Allergen Reactions    Pcn [Penicillins] Anaphylaxis    Latex Itching       Physical examination  Vitals: 142/84, repeat 153/84  BMI= 29 (86 kg)    Constitutional: In general, the patient is a pleasant male in no acute distress.    Cardiovascular: Carotids +2/2 bilaterally without bruits.  No jugular venous distension. Regular rate and rhythm. Normal S1, S2. No murmur, rub, click, or gallop.   Extremities: Pulses are normal bilaterally throughout. No peripheral edema.  Respiratory: Clear to asculation.  No ronchi, wheezes, rales.  No dullness to percussion.   Chest area left sternum: ILR palpable, incision clean      I have personally and independently reviewed the following:  Labs:   2/18/2020: cr 0.54    Echo 1/22/2018: EF 65%, normal    Stress test nuclear lexiscan 4/10/2023: no ischemia    EKG:   TODAY 4/18/2024: sinus 98 bpm, no change  1/15/2024: sinus tach with PACs, ERWP; no significant change from 4/14/2018  3/13/2017:       Assessment :  Paroxysmal atrial fibrillation s/p cryoablation 2017. [id number] score is 3 for CVA and likely HTN. His most recent labs were from 2020. He should be on Apixaban 5 mg bid - I emphasized the importance of taking Eliquis as prescribed - once a day dosing does not confer protection against thromboembolic events. Unclear if he's had recurrence.  ILR implant, battery dead. We do not usually re-implant ILR. He does not want another procedure. It is acceptable to leave it in place.  CVA s/p tPA in 2017. Minimal residual deficits.  Hypertension. He states that he has not had prior diagnosis and had not been treated previously. Brief review of prior office visits at [facility] show BP ~ 130-140/90. Carvedilol is an option although that is twice a day and compliance is an issue. He needs to establish with PCP for BP  management and close follow up.  SVT s/p accessory pathway ablation, this is considered curative.      Plan:  Advised him to take Apixaban 5 mg bid  ILR does not need replacement or removal at this time  I recommend blood draw to evaluate his creatinine and he refused  He needs to establish with PCP for BP management          I spent a total of 30 minutes face to face with  Ainsley Leal during today's office visit. I have spend an additional 30 minutes today on chart review and documentation.        The patient is to return in 6 months. The patient understood the treatment plan as outlined above.  There were no barriers to learning.      Meeta Palumbo MD

## 2024-04-18 ENCOUNTER — OFFICE VISIT (OUTPATIENT)
Dept: CARDIOLOGY | Facility: CLINIC | Age: 64
End: 2024-04-18
Payer: COMMERCIAL

## 2024-04-18 ENCOUNTER — PRE VISIT (OUTPATIENT)
Dept: CARDIOLOGY | Facility: CLINIC | Age: 64
End: 2024-04-18

## 2024-04-18 VITALS
OXYGEN SATURATION: 96 % | SYSTOLIC BLOOD PRESSURE: 142 MMHG | HEART RATE: 96 BPM | DIASTOLIC BLOOD PRESSURE: 84 MMHG | BODY MASS INDEX: 29.87 KG/M2 | WEIGHT: 190.7 LBS

## 2024-04-18 DIAGNOSIS — I48.0 PAROXYSMAL ATRIAL FIBRILLATION (H): ICD-10-CM

## 2024-04-18 DIAGNOSIS — R06.09 DOE (DYSPNEA ON EXERTION): ICD-10-CM

## 2024-04-18 DIAGNOSIS — Z86.79 HISTORY OF WOLFF-PARKINSON-WHITE (WPW) SYNDROME: ICD-10-CM

## 2024-04-18 PROCEDURE — 93005 ELECTROCARDIOGRAM TRACING: CPT

## 2024-04-18 PROCEDURE — 93010 ELECTROCARDIOGRAM REPORT: CPT | Performed by: INTERNAL MEDICINE

## 2024-04-18 PROCEDURE — 99213 OFFICE O/P EST LOW 20 MIN: CPT | Performed by: INTERNAL MEDICINE

## 2024-04-18 PROCEDURE — 99205 OFFICE O/P NEW HI 60 MIN: CPT | Performed by: INTERNAL MEDICINE

## 2024-04-18 RX ORDER — PROPRANOLOL HYDROCHLORIDE 20 MG/1
20 TABLET ORAL 2 TIMES DAILY
COMMUNITY
Start: 2023-02-16 | End: 2024-05-02

## 2024-04-18 ASSESSMENT — PAIN SCALES - GENERAL: PAINLEVEL: NO PAIN (0)

## 2024-04-18 NOTE — LETTER
"4/18/2024      RE: Ainsley Leal  [id number]  [place] [address]       Dear Colleague,    Thank you for the opportunity to participate in the care of your patient, Ainsley Leal, at the [facility] [place] [facility]. Please see a copy of my visit note below.    I am delighted to see Ainsley Leal as a new patient in cardiology clinic for evaluation of atrial arrhythmias.    History of Present Illness:  The patient is a 63 year old  male who presented with SVT on 3/11/2017, both narrow and wide complex tachycardias. He underwent EPS which demonstrated an anteroseptal accessory pathway with both antegrade and retrograde conduction, as well as inducible orthodromic reentry tachycardia, and the pathway was successfully ablated. On 3/13/2017 he presented to ED with atrial fibrillation with rates in the 160s, spontaneously converted. He was started on sotalol. On 3/15/2017 he developed right hemiparesis and was diagnosed with ischemic stroke, treated with tPA. He was discharged on apixaban. He underwent AF cryoablation on 8/29/2017. He has been maintained on apixaban.      \"Juma\" is here today with a friend whose employer is Juma's emergency contact. He has [place] family [place]. Due to insurance changes he wants to establish follow up here. He lives independently. He is not compliant with medications. His main complaint is chronic shortness of breath. He continues to smoke up to 15 cigs per day. He reported some rapid heart rates several weeks ago, did not seek medical care. He says that a monitor was implanted in him after his last ablation and he was told he needed to have the battery changed every 3 years or so and he does not want to do that. He had been prescribed propranolol several years ago for intermittent chest pain and he's never taken it.       Past Medical History:  SVT, presented with both narrow and wide " tachycardia; EPS 3/11/2017 showed baseline HV 42 ms, however atrial pacing showed preexcitation with QRS similar to his WCT; s/p ablation anteroseptal pathway  Atrial fibrillation, initially presented 3/13/2017, spontaneous conversion, started on sotalol; s/p cryoablation + touch up RSVP/RA connection, 8/29/2017. ILR implanted.  CVA treated by tPA 3/15/2017  Prior cocaine use  Ongoing tobacco use       Medications:   Apixaban 5 mg, he takes it daily  Aspirin 81 mg some times    Allergies:    Allergies   Allergen Reactions     Pcn [Penicillins] Anaphylaxis     Latex Itching       Physical examination  Vitals: 142/84, repeat 153/84  BMI= 29 (86 kg)    Constitutional: In general, the patient is a pleasant male in no acute distress.    Cardiovascular: Carotids +2/2 bilaterally without bruits.  No jugular venous distension. Regular rate and rhythm. Normal S1, S2. No murmur, rub, click, or gallop.   Extremities: Pulses are normal bilaterally throughout. No peripheral edema.  Respiratory: Clear to asculation.  No ronchi, wheezes, rales.  No dullness to percussion.   Chest area left sternum: ILR palpable, incision clean      I have personally and independently reviewed the following:  Labs:   2/18/2020: cr 0.54    Echo 1/22/2018: EF 65%, normal    Stress test nuclear lexiscan 4/10/2023: no ischemia    EKG:   TODAY 4/18/2024: sinus 98 bpm, no change  1/15/2024: sinus tach with PACs, ERWP; no significant change from 4/14/2018  3/13/2017:       Assessment :  Paroxysmal atrial fibrillation s/p cryoablation 2017. [id number] score is 3 for CVA and likely HTN. His most recent labs were from 2020. He should be on Apixaban 5 mg bid - I emphasized the importance of taking Eliquis as prescribed - once a day dosing does not confer protection against thromboembolic events. Unclear if he's had recurrence.  ILR implant, battery dead. We do not usually re-implant ILR. He does not want another procedure. It is acceptable to leave it  in place.  CVA s/p tPA in 2017. Minimal residual deficits.  Hypertension. He states that he has not had prior diagnosis and had not been treated previously. Brief review of prior office visits at [facility] show BP ~ 130-140/90. Carvedilol is an option although that is twice a day and compliance is an issue. He needs to establish with PCP for BP management and close follow up.  SVT s/p accessory pathway ablation, this is considered curative.      Plan:  Advised him to take Apixaban 5 mg bid  ILR does not need replacement or removal at this time  I recommend blood draw to evaluate his creatinine and he refused  He needs to establish with PCP for BP management          I spent a total of 30 minutes face to face with  Ainsley Leal during today's office visit. I have spend an additional 30 minutes today on chart review and documentation.        The patient is to return in 6 months. The patient understood the treatment plan as outlined above.  There were no barriers to learning.      Meeta Palumbo MD

## 2024-04-18 NOTE — PATIENT INSTRUCTIONS
You were seen in the Electrophysiology Clinic today by: Dr Meeta Palumbo    Plan:   Medication Changes: none, need blood pressure management - need to find PCP  Continue Eliquis 5 mg twice a day   Don't need aspirin      Follow up Visit: 6 months with EP JUANIS          If you have further questions, please utilize [employer] to contact us.     Your Care Team:    [facility] Cardiology   Telephone Number     Nurse Line  Mabel Ann, RN   Leora Summers, RN  Gustavo Grace RN   [phone]     For scheduling appointments:   Sarah   [phone]   For procedure scheduling:    Summer Zapata     [phone]   For the Device Clinic (Pacemakers, ICDs, Loop Recorders)    During business hours: [phone]  After business hours:   [phone]- select option 4 and ask for job code [id number].       On-call cardiologist for after hours or on weekends:   [phone], option #4, and ask to speak to the on-call cardiologist.     Cardiovascular Clinic:   [address] [place] [address]. [place] [address]      As always, Thank you for trusting us with your health care needs!

## 2024-04-19 LAB
ATRIAL RATE - MUSE: 98 BPM
DIASTOLIC BLOOD PRESSURE - MUSE: NORMAL MMHG
INTERPRETATION ECG - MUSE: NORMAL
P AXIS - MUSE: 61 DEGREES
PR INTERVAL - MUSE: 150 MS
QRS DURATION - MUSE: 86 MS
QT - MUSE: 346 MS
QTC - MUSE: 441 MS
R AXIS - MUSE: -72 DEGREES
SYSTOLIC BLOOD PRESSURE - MUSE: NORMAL MMHG
T AXIS - MUSE: 42 DEGREES
VENTRICULAR RATE- MUSE: 98 BPM

## 2024-05-02 ENCOUNTER — HOSPITAL ENCOUNTER (EMERGENCY)
Facility: CLINIC | Age: 64
Discharge: HOME | End: 2024-05-02
Attending: EMERGENCY MEDICINE | Admitting: EMERGENCY MEDICINE
Payer: COMMERCIAL

## 2024-05-02 VITALS
OXYGEN SATURATION: 99 % | HEART RATE: 112 BPM | SYSTOLIC BLOOD PRESSURE: 155 MMHG | RESPIRATION RATE: 20 BRPM | TEMPERATURE: 97.8 F | DIASTOLIC BLOOD PRESSURE: 92 MMHG

## 2024-05-02 DIAGNOSIS — L03.317 CELLULITIS OF BUTTOCK: ICD-10-CM

## 2024-05-02 DIAGNOSIS — M79.18 ACUTE BUTTOCK PAIN: ICD-10-CM

## 2024-05-02 DIAGNOSIS — T30.0 CONTACT BURN: ICD-10-CM

## 2024-05-02 LAB
ATRIAL RATE - MUSE: 95 BPM
DIASTOLIC BLOOD PRESSURE - MUSE: NORMAL MMHG
INTERPRETATION ECG - MUSE: NORMAL
P AXIS - MUSE: 60 DEGREES
PR INTERVAL - MUSE: 150 MS
QRS DURATION - MUSE: 86 MS
QT - MUSE: 344 MS
QTC - MUSE: 432 MS
R AXIS - MUSE: -72 DEGREES
SYSTOLIC BLOOD PRESSURE - MUSE: NORMAL MMHG
T AXIS - MUSE: 42 DEGREES
VENTRICULAR RATE- MUSE: 95 BPM

## 2024-05-02 PROCEDURE — 99284 EMERGENCY DEPT VISIT MOD MDM: CPT | Performed by: EMERGENCY MEDICINE

## 2024-05-02 PROCEDURE — 93005 ELECTROCARDIOGRAM TRACING: CPT | Performed by: EMERGENCY MEDICINE

## 2024-05-02 PROCEDURE — 250N000013 HC RX MED GY IP 250 OP 250 PS 637: Performed by: EMERGENCY MEDICINE

## 2024-05-02 RX ORDER — OXYCODONE HYDROCHLORIDE 5 MG/1
5 TABLET ORAL ONCE
Status: COMPLETED | OUTPATIENT
Start: 2024-05-02 | End: 2024-05-02

## 2024-05-02 RX ORDER — ACETAMINOPHEN 500 MG
1000 TABLET ORAL ONCE
Status: COMPLETED | OUTPATIENT
Start: 2024-05-02 | End: 2024-05-02

## 2024-05-02 RX ORDER — DOXYCYCLINE 100 MG/1
100 CAPSULE ORAL ONCE
Status: COMPLETED | OUTPATIENT
Start: 2024-05-02 | End: 2024-05-02

## 2024-05-02 RX ORDER — ASPIRIN 81 MG/1
81 TABLET, CHEWABLE ORAL DAILY
COMMUNITY
End: 2024-05-02

## 2024-05-02 RX ORDER — DOXYCYCLINE 100 MG/1
100 CAPSULE ORAL 2 TIMES DAILY
Qty: 14 CAPSULE | Refills: 0 | Status: SHIPPED | OUTPATIENT
Start: 2024-05-02 | End: 2024-05-09

## 2024-05-02 RX ORDER — OXYCODONE HYDROCHLORIDE 5 MG/1
5 TABLET ORAL EVERY 8 HOURS PRN
Qty: 12 TABLET | Refills: 0 | Status: SHIPPED | OUTPATIENT
Start: 2024-05-02 | End: 2024-05-07

## 2024-05-02 RX ADMIN — OXYCODONE HYDROCHLORIDE 5 MG: 5 TABLET ORAL at 10:45

## 2024-05-02 RX ADMIN — OXYCODONE HYDROCHLORIDE 5 MG: 5 TABLET ORAL at 11:42

## 2024-05-02 RX ADMIN — ACETAMINOPHEN 1000 MG: 500 TABLET ORAL at 10:45

## 2024-05-02 RX ADMIN — DOXYCYCLINE HYCLATE 100 MG: 100 CAPSULE ORAL at 11:07

## 2024-05-02 ASSESSMENT — COLUMBIA-SUICIDE SEVERITY RATING SCALE - C-SSRS
2. HAVE YOU ACTUALLY HAD ANY THOUGHTS OF KILLING YOURSELF IN THE PAST MONTH?: NO
1. IN THE PAST MONTH, HAVE YOU WISHED YOU WERE DEAD OR WISHED YOU COULD GO TO SLEEP AND NOT WAKE UP?: NO
6. HAVE YOU EVER DONE ANYTHING, STARTED TO DO ANYTHING, OR PREPARED TO DO ANYTHING TO END YOUR LIFE?: NO

## 2024-05-02 ASSESSMENT — ACTIVITIES OF DAILY LIVING (ADL)
ADLS_ACUITY_SCORE: 35
ADLS_ACUITY_SCORE: 35
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
## 2024-05-02 NOTE — PHARMACY-ADMISSION MEDICATION HISTORY
Pharmacist Admission Medication History    Admission medication history is complete. The information provided in this note is only as accurate as the sources available at the time of the update.    Information Source(s): Clinic records, Hospital records, and [facility]A    Pertinent Information: Patient has several documentation's of noncompliance including recently    Changes made to PTA medication list:  Added: None  Deleted:   Miralax prn  Asmanex prn    Changed:   Eliquis previously listed as once daily per what patient was doing, but updated to correctly establish dosing regimen    Allergies reviewed with patient and updates made in EHR: no    Medication History Completed By: Migue Amor RPH 5/2/2024 12:00 PM    PTA Med List   Medication Sig Last Dose    apixaban ANTICOAGULANT (ELIQUIS) 5 MG tablet Take 5 mg by mouth 2 times daily Unknown     Migue Amor PharmD, BCTXP, BCPS  Inpatient clinical pharmacist
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
## 2024-05-15 ENCOUNTER — OFFICE VISIT (OUTPATIENT)
Dept: FAMILY MEDICINE | Facility: CLINIC | Age: 64
End: 2024-05-15
Payer: COMMERCIAL

## 2024-05-15 VITALS
SYSTOLIC BLOOD PRESSURE: 135 MMHG | RESPIRATION RATE: 19 BRPM | OXYGEN SATURATION: 98 % | TEMPERATURE: 98 F | DIASTOLIC BLOOD PRESSURE: 83 MMHG | HEART RATE: 95 BPM | HEIGHT: 66 IN | WEIGHT: 184 LBS | BODY MASS INDEX: 29.57 KG/M2

## 2024-05-15 DIAGNOSIS — T30.0 CONTACT BURN: Primary | ICD-10-CM

## 2024-05-15 PROCEDURE — 99213 OFFICE O/P EST LOW 20 MIN: CPT

## 2024-05-15 PROCEDURE — G2211 COMPLEX E/M VISIT ADD ON: HCPCS

## 2024-05-15 RX ORDER — LIDOCAINE/PRILOCAINE 2.5 %-2.5%
CREAM (GRAM) TOPICAL PRN
Qty: 30 G | Refills: 0 | Status: SHIPPED | OUTPATIENT
Start: 2024-05-15

## 2024-05-15 ASSESSMENT — ENCOUNTER SYMPTOMS: BURN: 1
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
## 2024-10-31 ENCOUNTER — OFFICE VISIT (OUTPATIENT)
Dept: CARDIOLOGY | Facility: CLINIC | Age: 64
End: 2024-10-31
Attending: INTERNAL MEDICINE
Payer: COMMERCIAL

## 2024-10-31 VITALS
BODY MASS INDEX: 28.76 KG/M2 | OXYGEN SATURATION: 99 % | HEART RATE: 109 BPM | SYSTOLIC BLOOD PRESSURE: 131 MMHG | DIASTOLIC BLOOD PRESSURE: 89 MMHG | WEIGHT: 178.2 LBS

## 2024-10-31 DIAGNOSIS — Z86.79 HISTORY OF WOLFF-PARKINSON-WHITE (WPW) SYNDROME: ICD-10-CM

## 2024-10-31 DIAGNOSIS — I48.0 PAROXYSMAL ATRIAL FIBRILLATION (H): Primary | ICD-10-CM

## 2024-10-31 DIAGNOSIS — R21 RASH AND NONSPECIFIC SKIN ERUPTION: ICD-10-CM

## 2024-10-31 PROCEDURE — 99213 OFFICE O/P EST LOW 20 MIN: CPT | Performed by: NURSE PRACTITIONER

## 2024-10-31 PROCEDURE — 93005 ELECTROCARDIOGRAM TRACING: CPT

## 2024-10-31 PROCEDURE — 99214 OFFICE O/P EST MOD 30 MIN: CPT | Performed by: NURSE PRACTITIONER

## 2024-10-31 RX ORDER — FLUTICASONE PROPIONATE 50 MCG
1 SPRAY, SUSPENSION (ML) NASAL DAILY
COMMUNITY

## 2024-10-31 RX ORDER — HYDROCORTISONE 25 MG/G
CREAM TOPICAL 2 TIMES DAILY
Qty: 30 G | Refills: 1 | Status: SHIPPED | OUTPATIENT
Start: 2024-10-31

## 2024-10-31 ASSESSMENT — PAIN SCALES - GENERAL: PAINLEVEL_OUTOF10: NO PAIN (0)

## 2024-10-31 NOTE — LETTER
10/31/2024      RE: Ainsley Leal  [id number]  [place] [address]       Dear Colleague,    Thank you for the opportunity to participate in the care of your patient, Ainsley Leal, at the [facility]. Please see a copy of my visit note below.        ELECTROPHYSIOLOGY CLINIC VISIT    Assessment/Recommendations   Assessment/Plan:      Chief complaints fatigue, PIEDRA, possible syncopal events. Discussed that further evaluation is warranted and recommend labs and Zio. Unfortunately, Juma is refusing any further diagnostic testing. He agrees to continue eliquis and we discussed that he needs to take this twice daily. He has a rash on his forehead which I will prescribe hydrocortisone for.     # Paroxysmal atrial fibrillation s/p cryoablation 2017. [id number] score is 3 for CVA and likely HTN. His most recent labs were from 2020. He should be on Apixaban 5 mg bid - I emphasized the importance of taking Eliquis as prescribed - once a day dosing does not confer protection against thromboembolic events. Unclear if he's had recurrence.    # ILR implant, battery dead. We do not usually re-implant ILR. He does not want another procedure. It is acceptable to leave it in place.    # CVA s/p tPA in 2017. Minimal residual deficits.    # Hypertension. He states that he has not had prior diagnosis and had not been treated previously. Brief review of prior office visits at [facility] show BP ~ 130-140/90. Carvedilol is an option although that is twice a day and compliance is an issue. He needs to establish with PCP for BP management and close follow up.    # SVT s/p accessory pathway ablation, this is considered curative.    Follow up with EP JUANIS in 6 months       History of Present Illness/Subjective    Mr. Ainsley Leal is a 63 year old male who comes in today for EP follow-up of pAF, 6 month follow up.    Mr. Leal presented  with SVT on 3/11/2017, both narrow and wide complex tachycardias. He underwent EPS which demonstrated an anteroseptal accessory pathway with both antegrade and retrograde conduction, as well as inducible orthodromic reentry tachycardia, and the pathway was successfully ablated. On 3/13/2017 he presented to ED with atrial fibrillation with rates in the 160s, spontaneously converted. He was started on sotalol. On 3/15/2017 he developed right hemiparesis and was diagnosed with ischemic stroke, treated with tPA. He was discharged on apixaban. He underwent AF cryoablation on 8/29/2017. He has been maintained on apixaban.       Seen by Dr. Palumbo 4/2024. He is not compliant with medications. His main complaint is chronic shortness of breath. He continues to smoke up to 15 cigs per day.   He had been prescribed propranolol several years ago for intermittent chest pain and he's never taken it.     Today, Juma's chief complaints are fatigue and weakness. If he is not working, feels like he needs to sleep. Continues to have chronic shortness of breath on exertion. He is smoking 7-15 cigarettes/day. He tells me that he has had several fainting episodes where he will pass out for several hours. On a few occasions he has awoken on the floor, but in other instances he has been sitting. Most recently he was on the lightrail and said he passed out for 2 hours and when he awoke he was disoriented. Unclear if these episodes are actually syncopal events versus sleeping. No prodromal symptoms. Only taking eliquis once daily.     He denies chest discomfort, palpitations, abdominal fullness/bloating or peripheral edema, paroxysmal nocturnal dyspnea, orthopnea, lightheadedness, dizziness, pre-syncope, or syncope.     Presenting 12 lead ECG shows sinus tachycardia Vent Rate 104 bpm,  ms, QRS 88 ms, QTc 454 ms.     Current cardiac medications include: eliquis 5 mg BID.     I have reviewed and updated the patient's Past Medical History,  "Social History, Family History and Medication List.     Physical Examination   There were no vitals taken for this visit.  Wt Readings from Last 3 Encounters:   05/15/24 83.5 kg (184 lb)   04/18/24 86.5 kg (190 lb 11.2 oz)   01/22/24 86 kg (189 lb 8 oz)   /89 (BP Location: Right arm, Patient Position: Chair, Cuff Size: Adult Regular)   Pulse 109   Wt 80.8 kg (178 lb 3.2 oz)   SpO2 99%   BMI 28.76 kg/m       General Appearance:   Alert, well-appearing and in no acute distress.   HEENT: Atraumatic, normocephalic. PERRL.  MMM.   Chest/Lungs:   Respirations unlabored.  Lungs are clear to auscultation.   Cardiovascular:   Tachycardic,Regular rhythm.  S1/S2. No murmur.    Abdomen:  Soft, nontender, nondistended.   Extremities: No cyanosis or clubbing. No edema.    Musculoskeletal: Moves all extremities.  Gait wnl.   Skin: Warm, dry, intact.    Neurologic: Mood and affect are appropriate.  Alert and oriented to person, place, time, and situation.          Medications  Allergies   Current Outpatient Medications   Medication Sig Dispense Refill     apixaban ANTICOAGULANT (ELIQUIS) 5 MG tablet Take 5 mg by mouth 2 times daily       lidocaine-prilocaine (EMLA) 2.5-2.5 % external cream Apply topically as needed for moderate pain 30 g 0    Allergies   Allergen Reactions     Pcn [Penicillins] Anaphylaxis     Dust Mites      Latex Itching     Nickel Dermatitis     Unable to wear jewelry or metal belt     Wool Fiber          Lab Results (Personally Reviewed)    Chemistry/lipid CBC Cardiac Enzymes/BNP/TSH/INR   Lab Results   Component Value Date    BUN 19 02/18/2020     02/18/2020    CO2 28 02/18/2020     Creatinine   Date Value Ref Range Status   02/18/2020 0.54 (L) 0.66 - 1.25 mg/dL Final       No results found for: \"CHOL\", \"HDL\", \"LDL\", \"CHOLHDL\"   Lab Results   Component Value Date    WBC 5.3 02/18/2020    HGB 15.7 02/18/2020    HCT 49.7 02/18/2020    MCV 90 02/18/2020     02/18/2020    No results found " "for: \"CKTOTAL\", \"CKMB\", \"TROPONINI\", \"BNP\", \"TSH\", \"INR\"     The patient states understanding and is agreeable with the plan.   Nicole Cortez DNP, NP-C  Cardiac Electrophysiology   10/31/24      Total time spent on patient visit, reviewing notes, imaging, labs, orders, and completing necessary documentation: 40 minutes.           Please do not hesitate to contact me if you have any questions/concerns.     Sincerely,     Nicole Cortez, APRN CNP  "

## 2024-10-31 NOTE — NURSING NOTE
Chief Complaint   Patient presents with    Follow Up     Return EP for 6 mo follow-up d/t WPW/AF/PIEDRA/ILR     Vitals were taken and medications reconciled.    Alex Kessler, EMT  2:22 PM

## 2024-10-31 NOTE — PROGRESS NOTES
ELECTROPHYSIOLOGY CLINIC VISIT    Assessment/Recommendations   Assessment/Plan:      Chief complaints fatigue, PIEDRA, possible syncopal events. Discussed that further evaluation is warranted and recommend labs and Zio. Unfortunately, Juma is refusing any further diagnostic testing. He agrees to continue eliquis and we discussed that he needs to take this twice daily. He has a rash on his forehead which I will prescribe hydrocortisone for.     # Paroxysmal atrial fibrillation s/p cryoablation 2017. [id number] score is 3 for CVA and likely HTN. His most recent labs were from 2020. He should be on Apixaban 5 mg bid - I emphasized the importance of taking Eliquis as prescribed - once a day dosing does not confer protection against thromboembolic events. Unclear if he's had recurrence.    # ILR implant, battery dead. We do not usually re-implant ILR. He does not want another procedure. It is acceptable to leave it in place.    # CVA s/p tPA in 2017. Minimal residual deficits.    # Hypertension. He states that he has not had prior diagnosis and had not been treated previously. Brief review of prior office visits at [facility] show BP ~ 130-140/90. Carvedilol is an option although that is twice a day and compliance is an issue. He needs to establish with PCP for BP management and close follow up.    # SVT s/p accessory pathway ablation, this is considered curative.    Follow up with EP JUANIS in 6 months       History of Present Illness/Subjective    Mr. Ainsley Leal is a 63 year old male who comes in today for EP follow-up of pAF, 6 month follow up.    Mr. Leal presented with SVT on 3/11/2017, both narrow and wide complex tachycardias. He underwent EPS which demonstrated an anteroseptal accessory pathway with both antegrade and retrograde conduction, as well as inducible orthodromic reentry tachycardia, and the pathway was successfully ablated. On 3/13/2017 he presented to ED with atrial fibrillation with rates in  the 160s, spontaneously converted. He was started on sotalol. On 3/15/2017 he developed right hemiparesis and was diagnosed with ischemic stroke, treated with tPA. He was discharged on apixaban. He underwent AF cryoablation on 8/29/2017. He has been maintained on apixaban.       Seen by Dr. Palumbo 4/2024. He is not compliant with medications. His main complaint is chronic shortness of breath. He continues to smoke up to 15 cigs per day.   He had been prescribed propranolol several years ago for intermittent chest pain and he's never taken it.     Today, Juma's chief complaints are fatigue and weakness. If he is not working, feels like he needs to sleep. Continues to have chronic shortness of breath on exertion. He is smoking 7-15 cigarettes/day. He tells me that he has had several fainting episodes where he will pass out for several hours. On a few occasions he has awoken on the floor, but in other instances he has been sitting. Most recently he was on the lightrail and said he passed out for 2 hours and when he awoke he was disoriented. Unclear if these episodes are actually syncopal events versus sleeping. No prodromal symptoms. Only taking eliquis once daily.     He denies chest discomfort, palpitations, abdominal fullness/bloating or peripheral edema, paroxysmal nocturnal dyspnea, orthopnea, lightheadedness, dizziness, pre-syncope, or syncope.     Presenting 12 lead ECG shows sinus tachycardia Vent Rate 104 bpm,  ms, QRS 88 ms, QTc 454 ms.     Current cardiac medications include: eliquis 5 mg BID.     I have reviewed and updated the patient's Past Medical History, Social History, Family History and Medication List.     Physical Examination   There were no vitals taken for this visit.  Wt Readings from Last 3 Encounters:   05/15/24 83.5 kg (184 lb)   04/18/24 86.5 kg (190 lb 11.2 oz)   01/22/24 86 kg (189 lb 8 oz)   /89 (BP Location: Right arm, Patient Position: Chair, Cuff Size: Adult Regular)   Pulse  "109   Wt 80.8 kg (178 lb 3.2 oz)   SpO2 99%   BMI 28.76 kg/m       General Appearance:   Alert, well-appearing and in no acute distress.   HEENT: Atraumatic, normocephalic. PERRL.  MMM.   Chest/Lungs:   Respirations unlabored.  Lungs are clear to auscultation.   Cardiovascular:   Tachycardic,Regular rhythm.  S1/S2. No murmur.    Abdomen:  Soft, nontender, nondistended.   Extremities: No cyanosis or clubbing. No edema.    Musculoskeletal: Moves all extremities.  Gait wnl.   Skin: Warm, dry, intact.    Neurologic: Mood and affect are appropriate.  Alert and oriented to person, place, time, and situation.          Medications  Allergies   Current Outpatient Medications   Medication Sig Dispense Refill    apixaban ANTICOAGULANT (ELIQUIS) 5 MG tablet Take 5 mg by mouth 2 times daily      lidocaine-prilocaine (EMLA) 2.5-2.5 % external cream Apply topically as needed for moderate pain 30 g 0    Allergies   Allergen Reactions    Pcn [Penicillins] Anaphylaxis    Dust Mites     Latex Itching    Nickel Dermatitis     Unable to wear jewelry or metal belt    Wool Fiber          Lab Results (Personally Reviewed)    Chemistry/lipid CBC Cardiac Enzymes/BNP/TSH/INR   Lab Results   Component Value Date    BUN 19 02/18/2020     02/18/2020    CO2 28 02/18/2020     Creatinine   Date Value Ref Range Status   02/18/2020 0.54 (L) 0.66 - 1.25 mg/dL Final       No results found for: \"CHOL\", \"HDL\", \"LDL\", \"CHOLHDL\"   Lab Results   Component Value Date    WBC 5.3 02/18/2020    HGB 15.7 02/18/2020    HCT 49.7 02/18/2020    MCV 90 02/18/2020     02/18/2020    No results found for: \"CKTOTAL\", \"CKMB\", \"TROPONINI\", \"BNP\", \"TSH\", \"INR\"     The patient states understanding and is agreeable with the plan.   Nicole Cortez DNP, NP-C  Cardiac Electrophysiology   10/31/24      Total time spent on patient visit, reviewing notes, imaging, labs, orders, and completing necessary documentation: 40 minutes.         "

## 2024-10-31 NOTE — PATIENT INSTRUCTIONS
You were seen in the Electrophysiology Clinic today by: Nicole Cortez NP    Plan:   Medication Changes: Continue eliquis 1 tablet by mouth 2 time daily (AM/PM)    Follow up Visit: Please return to see Nicole Cortez NP in 6 months, sooner if needed    Further Instructions: Please establish with PCP for routine labs and management of blood pressure     If you have further questions, please utilize [employer] to contact us.     Your Care Team:    EP Cardiology   Telephone Number     Nurse Line  Mabel Ann, ROLLY Scott RN   [phone]     For scheduling appointments:   Sarah   [phone]   For procedure scheduling:    Summer Zapata     [phone]   For the Device Clinic (Pacemakers, ICDs, Loop Recorders)    During business hours: [phone]  After business hours:   [phone]- select option 4 and ask for job code [id number].       On-call cardiologist for after hours or on weekends:   [phone], option #4, and ask to speak to the on-call cardiologist.     Cardiovascular Clinic:   [address] [place] [address]. [place] [address]      As always, Thank you for trusting us with your health care needs!

## 2024-11-01 LAB
ATRIAL RATE - MUSE: 104 BPM
DIASTOLIC BLOOD PRESSURE - MUSE: NORMAL MMHG
INTERPRETATION ECG - MUSE: NORMAL
P AXIS - MUSE: 64 DEGREES
PR INTERVAL - MUSE: 156 MS
QRS DURATION - MUSE: 88 MS
QT - MUSE: 346 MS
QTC - MUSE: 454 MS
R AXIS - MUSE: -61 DEGREES
SYSTOLIC BLOOD PRESSURE - MUSE: NORMAL MMHG
T AXIS - MUSE: 33 DEGREES
VENTRICULAR RATE- MUSE: 104 BPM

## 2025-03-09 ENCOUNTER — HEALTH MAINTENANCE LETTER (OUTPATIENT)
Age: 65
End: 2025-03-09

## 2025-05-13 ENCOUNTER — OFFICE VISIT (OUTPATIENT)
Dept: FAMILY MEDICINE | Facility: CLINIC | Age: 65
End: 2025-05-13
Payer: COMMERCIAL

## 2025-05-13 VITALS
BODY MASS INDEX: 28.61 KG/M2 | TEMPERATURE: 97.7 F | RESPIRATION RATE: 21 BRPM | SYSTOLIC BLOOD PRESSURE: 144 MMHG | WEIGHT: 178 LBS | OXYGEN SATURATION: 92 % | DIASTOLIC BLOOD PRESSURE: 77 MMHG | HEIGHT: 66 IN | HEART RATE: 101 BPM

## 2025-05-13 DIAGNOSIS — I48.0 PAROXYSMAL ATRIAL FIBRILLATION (H): ICD-10-CM

## 2025-05-13 DIAGNOSIS — L21.9 SEBORRHEIC DERMATITIS: ICD-10-CM

## 2025-05-13 DIAGNOSIS — Z13.9 ENCOUNTER FOR SCREENING INVOLVING SOCIAL DETERMINANTS OF HEALTH (SDOH): ICD-10-CM

## 2025-05-13 DIAGNOSIS — R05.2 SUBACUTE COUGH: Primary | ICD-10-CM

## 2025-05-13 DIAGNOSIS — R21 RASH AND NONSPECIFIC SKIN ERUPTION: ICD-10-CM

## 2025-05-13 PROCEDURE — G2211 COMPLEX E/M VISIT ADD ON: HCPCS

## 2025-05-13 PROCEDURE — 1125F AMNT PAIN NOTED PAIN PRSNT: CPT

## 2025-05-13 PROCEDURE — 3077F SYST BP >= 140 MM HG: CPT

## 2025-05-13 PROCEDURE — 99214 OFFICE O/P EST MOD 30 MIN: CPT

## 2025-05-13 PROCEDURE — 3078F DIAST BP <80 MM HG: CPT

## 2025-05-13 RX ORDER — HYDROCORTISONE 25 MG/G
CREAM TOPICAL 2 TIMES DAILY
Qty: 30 G | Refills: 1 | Status: CANCELLED | OUTPATIENT
Start: 2025-05-13

## 2025-05-13 RX ORDER — KETOCONAZOLE 20 MG/ML
SHAMPOO, SUSPENSION TOPICAL DAILY PRN
Qty: 120 ML | Refills: 0 | Status: SHIPPED | OUTPATIENT
Start: 2025-05-13

## 2025-05-13 RX ORDER — PREDNISONE 20 MG/1
40 TABLET ORAL DAILY
Qty: 10 TABLET | Refills: 0 | Status: SHIPPED | OUTPATIENT
Start: 2025-05-13 | End: 2025-05-18

## 2025-05-13 RX ORDER — ALBUTEROL SULFATE 90 UG/1
1-2 AEROSOL, METERED RESPIRATORY (INHALATION) EVERY 4 HOURS PRN
COMMUNITY

## 2025-05-13 RX ORDER — BUDESONIDE AND FORMOTEROL FUMARATE DIHYDRATE 80; 4.5 UG/1; UG/1
2 AEROSOL RESPIRATORY (INHALATION) 2 TIMES DAILY PRN
Qty: 10.2 G | Refills: 0 | Status: SHIPPED | OUTPATIENT
Start: 2025-05-13

## 2025-05-13 RX ORDER — HYDROCORTISONE 25 MG/G
CREAM TOPICAL 2 TIMES DAILY
Qty: 60 G | Refills: 1 | Status: SHIPPED | OUTPATIENT
Start: 2025-05-13

## 2025-05-13 ASSESSMENT — PAIN SCALES - GENERAL: PAINLEVEL_OUTOF10: SEVERE PAIN (7)

## 2025-05-13 NOTE — PROGRESS NOTES
Assessment & Plan     Subacute cough  Seen in the emergency department on 4/2/2025 with cough.  Viral swabs and x-ray negative.  Has not really improved since then.  Continues to cough and feel shortness of breath.  Has been using albuterol inhaler multiple times per day with minimal improvement.  Lung sounds clear but decreased on exam today.  Is afebrile.   He is worried about blood clots in his lungs.  We discussed today that he is already taking  apixaban for his atrial fibrillation and therefore attaining a CT to rule out blood clot would not change treatment plan.  We reviewed that his continued smoking is most likely contributing to his symptoms.  Will treat with short course of steroids and change inhaler. Reviewed expected therapeutic effects, potential side effects, and proper administration.  If no improvement would consider additional imaging/pulmonology referral.  - budesonide-formoterol (SYMBICORT/BREYNA) 80-4.5 MCG/ACT Inhaler  Dispense: 10.2 g; Refill: 0  - predniSONE (DELTASONE) 20 MG tablet  Dispense: 10 tablet; Refill: 0    Paroxysmal atrial fibrillation (H)  Follows with cardiologist.  Continues on apixaban.    Rash and nonspecific skin eruption  Uses hydrocortisone as needed when has contact dermatitis from allergies.  Usually occurs on bilateral hands this time of year when he uses certain products at work.  - hydrocortisone 2.5 % cream  Dispense: 60 g; Refill: 1    Seborrheic dermatitis  Dry, erythematous, scaling patches on scalp. Discussed trial of shampoo 2 times weekly, if no improvement follow up.   - ketoconazole (NIZORAL) 2 % external shampoo  Dispense: 120 mL; Refill: 0    Encounter for screening involving social determinants of health (SDoH)  Difficulty affording medications. Has stable housing.   - Primary Care - Care Coordination Referral    Return precautions discussed, including when to seek urgent/emergent care. Verbalized understanding and agrees with plan.     Plan to  follow-up soon for routine annual physical.    The longitudinal plan of care for the diagnosis(es)/condition(s) as documented were addressed during this visit. Due to the added complexity in care, I will continue to support Ainsley in the subsequent management and with ongoing continuity of care.      MED REC REQUIREDPost Medication Reconciliation Status: patient was not discharged from an inpatient facility or TCU  Nicotine/Tobacco Cessation  He reports that he has been smoking cigarettes. He has a 15 pack-year smoking history. He has been exposed to tobacco smoke. He has never used smokeless tobacco.  Nicotine/Tobacco Cessation Plan  Information offered: Patient not interested at this time        Adithya Schmitz is a 64 year old, presenting for the following health issues:  ER F/U (april 2nd  seen at [facility], After coved test and x ray ,they wanted CT but he did not complete. Still having Non stop coughing 2 months/)      5/13/2025     9:42 AM   Additional Questions   Roomed by chavo SCHOFIELD        ED/UC Followup:    Facility:  [facility]  Date of visit: 4/2  Reason for visit: SOB  Current Status: no improvment      No real improvement with his cough. Feels short of breath when he exerts himself and feels like he has thick mucous he can't cough up.  Worried he has a blood clot as when he was in the emergency department they wanted to do a PE run however he did not want to wait and left.  Prescribed doxycycline and prednisone.  Got the medication slightly confused and missed some doses.  Thinks the prednisone helped a little.  Taking albuterol inhaler when he needs it.  Has been taking it at least once a day.  Unsure if this has been helpful.  No chest pain or peripheral edema.  Denies fevers or any recent viral illnesses.  Been consistent with taking his Eliquis.  Continues to smoke cigarettes.    Noticing some dryness in his scalp.      Review of Systems  Constitutional, HEENT, cardiovascular, pulmonary,  "gi and gu systems are negative, except as otherwise noted.      Objective    BP (!) 144/77 (BP Location: Left arm, Patient Position: Sitting, Cuff Size: Adult Large)   Pulse 101   Temp 97.7  F (36.5  C)   Resp 21   Ht 1.676 m (5' 6\")   Wt 80.7 kg (178 lb)   SpO2 92%   BMI 28.73 kg/m    Body mass index is 28.73 kg/m .    Physical Exam   GENERAL: alert and no distress  NECK: no adenopathy, no asymmetry, masses, or scars  RESP: lungs clear to auscultation - no rales, rhonchi or wheezes  CV: regular rates and rhythm, no murmur, click or rub, peripheral pulses strong, and no peripheral edema  SKIN: Dry, erythematous, scaling patches on scalp and in beard.  PSYCH: mentation appears normal, affect normal/bright          Signed Electronically by: CHERELLE Sahu CNP    "

## 2025-05-14 ENCOUNTER — PATIENT OUTREACH (OUTPATIENT)
Dept: CARE COORDINATION | Facility: CLINIC | Age: 65
End: 2025-05-14
Payer: COMMERCIAL

## 2025-05-14 DIAGNOSIS — Z59.86 FINANCIAL INSECURITY: Primary | ICD-10-CM

## 2025-05-14 SDOH — ECONOMIC STABILITY - INCOME SECURITY: FINANCIAL INSECURITY: Z59.86

## 2025-05-14 NOTE — PROGRESS NOTES
Clinic Care Coordination Contact  Community Health Worker Initial Outreach       Patient accepts CC: No, CHW placed a FRW and MTM referral for the patient. CHW will mail a list of food shelf resources as well. Patient will be sent Care Coordination introduction letter for future reference.       Stephany CHEATHAM Ambulatory CHW  [facility] Care Coordination   [facility]   Office: [phone]'

## 2025-05-14 NOTE — LETTER
[facility] CARE COORDINATION  [address] [facility] [address]    May 14, 2025    Ainsley Leal  [id number]  [place] [address]      Dear Ainsley,    I am a clinic community health worker who works with CHERELLE Sahu CNP with the [facility]. I wanted to thank you for spending the time to talk with me.  Below is a description of clinic care coordination and how I can further assist you.       The clinic care coordination team is made up of a registered nurse, , financial resource worker and community health worker who understand the health care system. The goal of clinic care coordination is to help you manage your health and improve access to the health care system. Our team works alongside your provider to assist you in determining your health and social needs. We can help you obtain health care and community resources, providing you with necessary information and education. We can work with you through any barriers and develop a care plan that helps coordinate and strengthen the communication between you and your care team.  Our services are voluntary and are offered without charge to you personally.    Please feel free to contact me with any questions or concerns regarding care coordination and what we can offer.      We are focused on providing you with the highest-quality healthcare experience possible.    Sincerely,     Stephany CHEATHAM Ambulatory CHW  [facility] Care Coordination   [place] [facility], and [facility]   Office: [phone]

## 2025-05-19 ENCOUNTER — VIRTUAL VISIT (OUTPATIENT)
Dept: PHARMACY | Facility: CLINIC | Age: 65
End: 2025-05-19
Payer: COMMERCIAL

## 2025-05-19 DIAGNOSIS — Z59.86 FINANCIAL INSECURITY: Primary | ICD-10-CM

## 2025-05-19 SDOH — ECONOMIC STABILITY - INCOME SECURITY: FINANCIAL INSECURITY: Z59.86

## 2025-05-19 NOTE — PROGRESS NOTES
Clinical Pharmacy Consult:                                                    Ainsley Leal is a 64 year old male called for a clinical pharmacist consult.  He was referred to me from Dr. Holt.     Reason for Consult: Financial insecurities     Discussion: Writer called patient to discuss about medications, but patient did not provide any information despite introducing myself who the writer was. Patient noted, PCP should text him about why he was referred.     Plan:  1. PharmD communicated with PCP about letting patient know why he was referred to MT        Trent Bernal PharmD     Medication Therapy Management (MT) Pharmacist     [phone]     [email or web address]     [facility]

## 2025-05-19 NOTE — PATIENT INSTRUCTIONS
"Recommendations from today's MTM visit:                                                      MTM (medication therapy management) is a service provided by a clinical pharmacist designed to help you get the most of out of your medicines.   Today we reviewed what your medicines are for, how to know if they are working, that your medicines are safe and how to make your medicine regimen as easy as possible.      Pharm[facility]I communicated with PCP about letting patient know why he was referred to MTM    Follow-up:  Due on when needed by patient, provider or insurance       It was great speaking with you today.  I value your experience and would be very thankful for your time in providing feedback in our clinic survey. In the next few days, you may receive an email or text message from [facility] with a link to a survey related to your  clinical pharmacist.\"     To schedule another MTM appointment, please call the clinic directly or you may call the MTM scheduling line at [phone].    My Clinical Pharmacist's contact information:                                                      Please feel free to contact me with any questions or concerns you have.        Trent Bernal PharmD     Medication Therapy Management (MTM) Pharmacist     [phone]     [email or web address]  "

## 2025-07-29 ENCOUNTER — OFFICE VISIT (OUTPATIENT)
Dept: FAMILY MEDICINE | Facility: CLINIC | Age: 65
End: 2025-07-29
Payer: COMMERCIAL

## 2025-07-29 VITALS
TEMPERATURE: 97.5 F | BODY MASS INDEX: 28.12 KG/M2 | HEART RATE: 100 BPM | RESPIRATION RATE: 16 BRPM | WEIGHT: 175 LBS | DIASTOLIC BLOOD PRESSURE: 73 MMHG | HEIGHT: 66 IN | OXYGEN SATURATION: 98 % | SYSTOLIC BLOOD PRESSURE: 113 MMHG

## 2025-07-29 DIAGNOSIS — E11.65 TYPE 2 DIABETES MELLITUS WITH HYPERGLYCEMIA, WITHOUT LONG-TERM CURRENT USE OF INSULIN (H): ICD-10-CM

## 2025-07-29 DIAGNOSIS — Z21 HIV INFECTION, UNSPECIFIED SYMPTOM STATUS (H): ICD-10-CM

## 2025-07-29 DIAGNOSIS — L02.91 CUTANEOUS ABSCESS, UNSPECIFIED SITE: ICD-10-CM

## 2025-07-29 DIAGNOSIS — N41.2 PROSTATE ABSCESS: Primary | ICD-10-CM

## 2025-07-29 DIAGNOSIS — A49.02 MRSA INFECTION: ICD-10-CM

## 2025-07-29 PROBLEM — L98.499 SKIN ULCER (H): Status: ACTIVE | Noted: 2025-07-18

## 2025-07-29 PROCEDURE — 3078F DIAST BP <80 MM HG: CPT

## 2025-07-29 PROCEDURE — 99215 OFFICE O/P EST HI 40 MIN: CPT

## 2025-07-29 PROCEDURE — 3074F SYST BP LT 130 MM HG: CPT

## 2025-07-29 PROCEDURE — 1125F AMNT PAIN NOTED PAIN PRSNT: CPT

## 2025-07-29 RX ORDER — OXYBUTYNIN CHLORIDE 5 MG/1
5 TABLET ORAL 3 TIMES DAILY PRN
COMMUNITY
Start: 2025-07-22

## 2025-07-29 RX ORDER — OXYCODONE HYDROCHLORIDE 5 MG/1
5 TABLET ORAL EVERY 4 HOURS PRN
COMMUNITY
Start: 2025-07-22

## 2025-07-29 RX ORDER — GLIPIZIDE 5 MG/1
5 TABLET ORAL
COMMUNITY
Start: 2025-07-23

## 2025-07-29 RX ORDER — POLYETHYLENE GLYCOL 3350 17 G/17G
17 POWDER, FOR SOLUTION ORAL DAILY PRN
COMMUNITY
Start: 2025-07-22

## 2025-07-29 RX ORDER — SULFAMETHOXAZOLE AND TRIMETHOPRIM 800; 160 MG/1; MG/1
1 TABLET ORAL 2 TIMES DAILY
COMMUNITY
Start: 2025-07-21 | End: 2025-09-01

## 2025-07-29 ASSESSMENT — PAIN SCALES - GENERAL: PAINLEVEL_OUTOF10: SEVERE PAIN (7)

## 2025-07-29 NOTE — PROGRESS NOTES
"  Assessment & Plan     Prostate abscess  Cutaneous abscess, unspecified site  MRSA infection  HIV infection, unspecified symptom status (H)  Type 2 diabetes mellitus with hyperglycemia, without long-term current use of insulin (H)  - Adult Diabetes Education  Referral    We did review his discharge medications and importance of DM education referral prior to him telling me that he cut his cortez catheter tubing. Cortez cathter remains in place with manpreet colored urine draining and bag tubing has been cut. Unclear if this was supposed to be removed prior to him discharging from the hospital as there is no urology follow up noted and his discharge paperwork instructs returning to the ED if he is unable to void after he discharges home. We dicussed he is at risk of infection as it is no longer a closed system. He adamantly refused to let me remove the cortez unless he was \"under anesthesia\". I then offered to get transportation to the ED which he refused and left the visit stating he needed to go home and shower first and then he would go to the ED. I again offered to remove the catheter and/or call transportation before he left the visit.     He does have a hospital follow up appointment 8/1 with one of my colleagues that he was reminded of prior to his departure.     Review of prior external note(s) from - CareEverywhere information from [facility] reviewed  Diagnosis or treatment significantly limited by social determinants of health -    55 minutes spent by me on the date of the encounter doing chart review, history and exam, documentation and further activities per the note      Subjective   Ainsley is a 64 year old, presenting for the following health issues:  Follow Up ([facility] follow up form [facility] visits 6/18, 6/19, 7/12,15 and 17 for groin and prostate issues/abscess.  Needs more pain medication. He states that he brought in [facility] paperwork sometime ago to be completed.)    History of Present Illness       Reason " for visit:  [facility] visit follow up   He is taking medications regularly.        Hospital Follow-up Visit:    Summary of hospitalization:  CareEverywhere information obtained and reviewed  Diagnostic Tests/Treatments reviewed.  Follow up needed: BMP  Other Healthcare Providers Involved in Patient s Care:         Homecare and Specialist appointment - [facility]  Update since discharge: worsened.     Plan of care communicated with patient       6/18  seen in the ED for rash and treated for dermatitis.  6/25 Saw dermatology - recommended skin biopsy which he declined. Started on triamcinolone.   7/12 ED with worsening rash and abscesses in inner thighs/groin. MRSA+. He declined I&D. Ct showed abscess along prostate gland as well. He left AMA on doxycyline.  7/17 Admitted with multiple abscesses.   S/p groin debridement and transurethral resection of prostate abscess.  Was refusing blood draws repeatedly in the hospital and asking to leave/smoke.  New diagnosis of diabetes with A1c 11.3. Started on glipizide and metformin.  HIV testing positive.    7/22/[place]- dc'd on 2 months of oral bactrim ds bid per id. Will need f/u in 1-2 weeks for hiv dx. D/w pt at [facility] to inform all previous and future sexual partners and use safe practices. Pt home on Dexcom system for new diabetes. He is very afraid of needles. Diabetic education in the outpt setting paramount. Home on glipizide 5 mg daily and metformin 1g bid. This was the easiest regimen for him at [facility] though will almost certainly need additional adjustments to achieve better control. Will need repeat bmp at f/u. Home with home health.      Having a lot of penile discomfort. Was discharged with a cortez and it was difficult for him to put his pants on so he cut the tubing 2 days ago and is just draining it in the toilet.  He is taking his new medications.  Unsure how to use the Dexcom.  Had home health come out but he refused to let them return as he does not feel like he needs any help.  "      Review of Systems  Constitutional, HEENT, cardiovascular, pulmonary, gi and gu systems are negative, except as otherwise noted.      Objective    /73 (BP Location: Left arm, Patient Position: Sitting, Cuff Size: Adult Regular)   Pulse 100   Temp 97.5  F (36.4  C) (Temporal)   Resp 16   Ht 1.676 m (5' 5.98\")   Wt 79.4 kg (175 lb)   SpO2 98%   BMI 28.26 kg/m    Body mass index is 28.26 kg/m .    Physical Exam   GENERAL: alert and fatigued   (male): Trimble catheter in place with bag tubing cut. Lashae colored urine in tubing.             Signed Electronically by: CHERELLE Sahu CNP    "

## 2025-07-30 ENCOUNTER — PATIENT OUTREACH (OUTPATIENT)
Dept: CARE COORDINATION | Facility: CLINIC | Age: 65
End: 2025-07-30
Payer: COMMERCIAL

## 2025-08-01 ENCOUNTER — OFFICE VISIT (OUTPATIENT)
Dept: FAMILY MEDICINE | Facility: CLINIC | Age: 65
End: 2025-08-01
Payer: COMMERCIAL

## 2025-08-01 VITALS
RESPIRATION RATE: 18 BRPM | SYSTOLIC BLOOD PRESSURE: 107 MMHG | BODY MASS INDEX: 29.16 KG/M2 | OXYGEN SATURATION: 97 % | HEIGHT: 65 IN | WEIGHT: 175 LBS | DIASTOLIC BLOOD PRESSURE: 66 MMHG | TEMPERATURE: 98 F | HEART RATE: 98 BPM

## 2025-08-01 DIAGNOSIS — N41.2 ABSCESS OF PROSTATE: ICD-10-CM

## 2025-08-01 DIAGNOSIS — Z21 HIV INFECTION, UNSPECIFIED SYMPTOM STATUS (H): ICD-10-CM

## 2025-08-01 DIAGNOSIS — Z09 HOSPITAL DISCHARGE FOLLOW-UP: Primary | ICD-10-CM

## 2025-08-01 DIAGNOSIS — R30.0 BURNING WITH URINATION: ICD-10-CM

## 2025-08-01 DIAGNOSIS — A53.9 SYPHILIS: ICD-10-CM

## 2025-08-01 LAB
CD19 CELLS # BLD: 64 CELLS/UL (ref 107–698)
CD19 CELLS NFR BLD: 6 % (ref 6–27)
CD3 CELLS # BLD: 932 CELLS/UL (ref 603–2990)
CD3 CELLS NFR BLD: 87 % (ref 49–84)
CD3+CD4+ CELLS # BLD: 44 CELLS/UL (ref 441–2156)
CD3+CD4+ CELLS NFR BLD: 4 % (ref 28–63)
CD3+CD4+ CELLS/CD3+CD8+ CLL BLD: 0.05 % (ref 1.4–2.6)
CD3+CD8+ CELLS # BLD: 855 CELLS/UL (ref 125–1312)
CD3+CD8+ CELLS NFR BLD: 80 % (ref 10–40)
CD3-CD16+CD56+ CELLS # BLD: 70 CELLS/UL (ref 95–640)
CD3-CD16+CD56+ CELLS NFR BLD: 7 % (ref 4–25)
HBV SURFACE AG SERPL QL IA: NONREACTIVE
T CELL EXTENDED COMMENT: ABNORMAL

## 2025-08-01 PROCEDURE — 86355 B CELLS TOTAL COUNT: CPT | Performed by: FAMILY MEDICINE

## 2025-08-01 PROCEDURE — 3078F DIAST BP <80 MM HG: CPT | Performed by: FAMILY MEDICINE

## 2025-08-01 PROCEDURE — 3074F SYST BP LT 130 MM HG: CPT | Performed by: FAMILY MEDICINE

## 2025-08-01 PROCEDURE — 86481 TB AG RESPONSE T-CELL SUSP: CPT | Mod: 59 | Performed by: FAMILY MEDICINE

## 2025-08-01 PROCEDURE — 90480 ADMN SARSCOV2 VAC 1/ONLY CMP: CPT | Performed by: FAMILY MEDICINE

## 2025-08-01 PROCEDURE — 1111F DSCHRG MED/CURRENT MED MERGE: CPT | Performed by: FAMILY MEDICINE

## 2025-08-01 PROCEDURE — G2211 COMPLEX E/M VISIT ADD ON: HCPCS | Performed by: FAMILY MEDICINE

## 2025-08-01 PROCEDURE — 86357 NK CELLS TOTAL COUNT: CPT | Performed by: FAMILY MEDICINE

## 2025-08-01 PROCEDURE — 36415 COLL VENOUS BLD VENIPUNCTURE: CPT | Performed by: FAMILY MEDICINE

## 2025-08-01 PROCEDURE — 99214 OFFICE O/P EST MOD 30 MIN: CPT | Mod: 25 | Performed by: FAMILY MEDICINE

## 2025-08-01 PROCEDURE — 86360 T CELL ABSOLUTE COUNT/RATIO: CPT | Performed by: FAMILY MEDICINE

## 2025-08-01 PROCEDURE — 86359 T CELLS TOTAL COUNT: CPT | Performed by: FAMILY MEDICINE

## 2025-08-01 PROCEDURE — 87536 HIV-1 QUANT&REVRSE TRNSCRPJ: CPT | Performed by: FAMILY MEDICINE

## 2025-08-01 PROCEDURE — 1125F AMNT PAIN NOTED PAIN PRSNT: CPT | Performed by: FAMILY MEDICINE

## 2025-08-01 PROCEDURE — 87340 HEPATITIS B SURFACE AG IA: CPT | Performed by: FAMILY MEDICINE

## 2025-08-01 PROCEDURE — 91320 SARSCV2 VAC 30MCG TRS-SUC IM: CPT | Performed by: FAMILY MEDICINE

## 2025-08-01 RX ORDER — PHENAZOPYRIDINE HYDROCHLORIDE 100 MG/1
100 TABLET, FILM COATED ORAL 3 TIMES DAILY PRN
Qty: 10 TABLET | Refills: 0 | Status: SHIPPED | OUTPATIENT
Start: 2025-08-01

## 2025-08-01 RX ORDER — DOXYCYCLINE 100 MG/1
100 CAPSULE ORAL 2 TIMES DAILY
Qty: 28 CAPSULE | Refills: 0 | Status: SHIPPED | OUTPATIENT
Start: 2025-08-01 | End: 2025-08-15

## 2025-08-01 RX ORDER — OXYCODONE HYDROCHLORIDE 5 MG/1
5 TABLET ORAL 2 TIMES DAILY
Qty: 6 TABLET | Refills: 0 | Status: SHIPPED | OUTPATIENT
Start: 2025-08-01 | End: 2025-08-04

## 2025-08-01 ASSESSMENT — PAIN SCALES - GENERAL: PAINLEVEL_OUTOF10: SEVERE PAIN (7)

## 2025-08-02 LAB
HIV1 RNA # PLAS NAA DL=20: ABNORMAL COPIES/ML (ref ?–1)
HIV1 RNA SERPL NAA+PROBE-LOG#: 5.1 {LOG_COPIES}/ML

## 2025-08-03 LAB
GAMMA INTERFERON BACKGROUND BLD IA-ACNC: 0.21 IU/ML
M TB IFN-G BLD-IMP: NEGATIVE
M TB IFN-G CD4+ BCKGRND COR BLD-ACNC: 9.79 IU/ML
MITOGEN IGNF BCKGRD COR BLD-ACNC: -0.02 IU/ML
MITOGEN IGNF BCKGRD COR BLD-ACNC: 0.01 IU/ML
QUANTIFERON MITOGEN: 10 IU/ML
QUANTIFERON NIL TUBE: 0.21 IU/ML
QUANTIFERON TB1 TUBE: 0.19 IU/ML
QUANTIFERON TB2 TUBE: 0.22

## 2025-08-04 ENCOUNTER — TELEPHONE (OUTPATIENT)
Dept: FAMILY MEDICINE | Facility: CLINIC | Age: 65
End: 2025-08-04
Payer: COMMERCIAL

## 2025-08-05 ENCOUNTER — RESULTS FOLLOW-UP (OUTPATIENT)
Dept: INTERNAL MEDICINE | Facility: CLINIC | Age: 65
End: 2025-08-05
Payer: COMMERCIAL

## 2025-08-05 DIAGNOSIS — Z13.9 ENCOUNTER FOR SCREENING INVOLVING SOCIAL DETERMINANTS OF HEALTH (SDOH): Primary | ICD-10-CM

## 2025-08-09 ENCOUNTER — HEALTH MAINTENANCE LETTER (OUTPATIENT)
Age: 65
End: 2025-08-09

## 2025-08-15 ENCOUNTER — PATIENT OUTREACH (OUTPATIENT)
Dept: CARE COORDINATION | Facility: CLINIC | Age: 65
End: 2025-08-15
Payer: COMMERCIAL

## 2025-08-19 ENCOUNTER — PATIENT OUTREACH (OUTPATIENT)
Dept: NURSING | Facility: CLINIC | Age: 65
End: 2025-08-19
Payer: COMMERCIAL

## 2025-08-26 ENCOUNTER — MYC REFILL (OUTPATIENT)
Dept: CARDIOLOGY | Facility: CLINIC | Age: 65
End: 2025-08-26
Payer: COMMERCIAL

## 2025-08-26 DIAGNOSIS — I48.0 PAROXYSMAL ATRIAL FIBRILLATION (H): ICD-10-CM
